# Patient Record
Sex: FEMALE | Race: WHITE | NOT HISPANIC OR LATINO | Employment: OTHER | URBAN - METROPOLITAN AREA
[De-identification: names, ages, dates, MRNs, and addresses within clinical notes are randomized per-mention and may not be internally consistent; named-entity substitution may affect disease eponyms.]

---

## 2023-06-23 DIAGNOSIS — I10 PRIMARY HYPERTENSION: Primary | ICD-10-CM

## 2023-06-23 RX ORDER — METOPROLOL SUCCINATE 25 MG/1
TABLET, EXTENDED RELEASE ORAL
Qty: 90 TABLET | Refills: 0 | Status: SHIPPED | OUTPATIENT
Start: 2023-06-23 | End: 2023-09-29

## 2023-08-14 ENCOUNTER — TELEPHONE (OUTPATIENT)
Dept: PRIMARY CARE | Facility: CLINIC | Age: 73
End: 2023-08-14
Payer: MEDICARE

## 2023-08-14 DIAGNOSIS — F41.9 ANXIETY: ICD-10-CM

## 2023-08-14 NOTE — TELEPHONE ENCOUNTER
Pt is going to dentist 08/29/23 Pt has higher blood pressure due to anxiety of appt are you able to prescribe something   [facility]   [phone]

## 2023-08-15 ENCOUNTER — TELEPHONE (OUTPATIENT)
Dept: PRIMARY CARE | Facility: CLINIC | Age: 73
End: 2023-08-15
Payer: MEDICARE

## 2023-08-16 PROBLEM — F41.9 ANXIETY: Status: ACTIVE | Noted: 2023-08-16

## 2023-08-16 RX ORDER — PROPRANOLOL HYDROCHLORIDE 10 MG/1
TABLET ORAL
Qty: 1 TABLET | Refills: 0 | Status: SHIPPED | OUTPATIENT
Start: 2023-08-16 | End: 2023-12-04 | Stop reason: WASHOUT

## 2023-09-07 ENCOUNTER — TELEPHONE (OUTPATIENT)
Dept: PRIMARY CARE | Facility: CLINIC | Age: 73
End: 2023-09-07
Payer: MEDICARE

## 2023-09-28 DIAGNOSIS — I10 PRIMARY HYPERTENSION: ICD-10-CM

## 2023-09-29 RX ORDER — METOPROLOL SUCCINATE 25 MG/1
TABLET, EXTENDED RELEASE ORAL
Qty: 90 TABLET | Refills: 0 | Status: SHIPPED | OUTPATIENT
Start: 2023-09-29 | End: 2023-11-27 | Stop reason: ALTCHOICE

## 2023-11-11 ENCOUNTER — TELEPHONE (OUTPATIENT)
Dept: PRIMARY CARE | Facility: CLINIC | Age: 73
End: 2023-11-11
Payer: MEDICARE

## 2023-11-11 DIAGNOSIS — I34.1 MITRAL VALVE PROLAPSE: ICD-10-CM

## 2023-11-11 DIAGNOSIS — I73.9 PVD (PERIPHERAL VASCULAR DISEASE) (CMS-HCC): ICD-10-CM

## 2023-11-11 DIAGNOSIS — Z13.220 LIPID SCREENING: ICD-10-CM

## 2023-11-11 DIAGNOSIS — E28.39 ESTROGEN DEFICIENCY: ICD-10-CM

## 2023-11-11 DIAGNOSIS — E55.9 VITAMIN D DEFICIENCY: ICD-10-CM

## 2023-11-11 DIAGNOSIS — Z13.29 THYROID DISORDER SCREEN: ICD-10-CM

## 2023-11-11 DIAGNOSIS — M85.9 DISORDER OF BONE DENSITY AND STRUCTURE, UNSPECIFIED: ICD-10-CM

## 2023-11-14 PROBLEM — I73.9 PVD (PERIPHERAL VASCULAR DISEASE) (CMS-HCC): Status: ACTIVE | Noted: 2023-11-14

## 2023-11-14 PROBLEM — E28.39 ESTROGEN DEFICIENCY: Status: ACTIVE | Noted: 2023-11-14

## 2023-11-14 PROBLEM — I34.1 MITRAL VALVE PROLAPSE: Status: ACTIVE | Noted: 2023-11-14

## 2023-11-14 PROBLEM — E55.9 VITAMIN D DEFICIENCY: Status: ACTIVE | Noted: 2023-11-14

## 2023-11-14 PROBLEM — M85.9 DISORDER OF BONE DENSITY AND STRUCTURE, UNSPECIFIED: Status: ACTIVE | Noted: 2023-11-14

## 2023-11-14 PROBLEM — L40.9 PSORIASIS: Status: ACTIVE | Noted: 2023-11-14

## 2023-11-16 ENCOUNTER — LAB (OUTPATIENT)
Dept: LAB | Facility: LAB | Age: 73
End: 2023-11-16
Payer: MEDICARE

## 2023-11-16 DIAGNOSIS — E28.39 ESTROGEN DEFICIENCY: ICD-10-CM

## 2023-11-16 DIAGNOSIS — Z13.220 LIPID SCREENING: ICD-10-CM

## 2023-11-16 DIAGNOSIS — Z13.29 THYROID DISORDER SCREEN: ICD-10-CM

## 2023-11-16 DIAGNOSIS — I34.1 MITRAL VALVE PROLAPSE: ICD-10-CM

## 2023-11-16 DIAGNOSIS — E55.9 VITAMIN D DEFICIENCY: ICD-10-CM

## 2023-11-16 LAB
25(OH)D3 SERPL-MCNC: 25 NG/ML (ref 30–100)
ALBUMIN SERPL BCP-MCNC: 4.5 G/DL (ref 3.4–5)
ALP SERPL-CCNC: 88 U/L (ref 33–136)
ALT SERPL W P-5'-P-CCNC: 16 U/L (ref 7–45)
ANION GAP SERPL CALC-SCNC: 14 MMOL/L (ref 10–20)
AST SERPL W P-5'-P-CCNC: 15 U/L (ref 9–39)
BASOPHILS # BLD AUTO: 0.04 X10*3/UL (ref 0–0.1)
BASOPHILS NFR BLD AUTO: 0.8 %
BILIRUB SERPL-MCNC: 0.5 MG/DL (ref 0–1.2)
BUN SERPL-MCNC: 15 MG/DL (ref 6–23)
CALCIUM SERPL-MCNC: 9.4 MG/DL (ref 8.6–10.6)
CHLORIDE SERPL-SCNC: 108 MMOL/L (ref 98–107)
CHOLEST SERPL-MCNC: 227 MG/DL (ref 0–199)
CHOLESTEROL/HDL RATIO: 3.7
CO2 SERPL-SCNC: 25 MMOL/L (ref 21–32)
CREAT SERPL-MCNC: 0.82 MG/DL (ref 0.5–1.05)
EOSINOPHIL # BLD AUTO: 0.39 X10*3/UL (ref 0–0.4)
EOSINOPHIL NFR BLD AUTO: 8.1 %
ERYTHROCYTE [DISTWIDTH] IN BLOOD BY AUTOMATED COUNT: 12.7 % (ref 11.5–14.5)
GFR SERPL CREATININE-BSD FRML MDRD: 77 ML/MIN/1.73M*2
GLUCOSE SERPL-MCNC: 128 MG/DL (ref 74–99)
HCT VFR BLD AUTO: 42.5 % (ref 36–46)
HDLC SERPL-MCNC: 61.6 MG/DL
HGB BLD-MCNC: 13.5 G/DL (ref 12–16)
IMM GRANULOCYTES # BLD AUTO: 0.02 X10*3/UL (ref 0–0.5)
IMM GRANULOCYTES NFR BLD AUTO: 0.4 % (ref 0–0.9)
LDLC SERPL CALC-MCNC: 143 MG/DL
LYMPHOCYTES # BLD AUTO: 1.61 X10*3/UL (ref 0.8–3)
LYMPHOCYTES NFR BLD AUTO: 33.3 %
MCH RBC QN AUTO: 28.7 PG (ref 26–34)
MCHC RBC AUTO-ENTMCNC: 31.8 G/DL (ref 32–36)
MCV RBC AUTO: 90 FL (ref 80–100)
MONOCYTES # BLD AUTO: 0.37 X10*3/UL (ref 0.05–0.8)
MONOCYTES NFR BLD AUTO: 7.6 %
NEUTROPHILS # BLD AUTO: 2.41 X10*3/UL (ref 1.6–5.5)
NEUTROPHILS NFR BLD AUTO: 49.8 %
NON HDL CHOLESTEROL: 165 MG/DL (ref 0–149)
NRBC BLD-RTO: 0 /100 WBCS (ref 0–0)
PLATELET # BLD AUTO: 174 X10*3/UL (ref 150–450)
POTASSIUM SERPL-SCNC: 4.1 MMOL/L (ref 3.5–5.3)
PROT SERPL-MCNC: 6.7 G/DL (ref 6.4–8.2)
RBC # BLD AUTO: 4.7 X10*6/UL (ref 4–5.2)
SODIUM SERPL-SCNC: 143 MMOL/L (ref 136–145)
TRIGL SERPL-MCNC: 112 MG/DL (ref 0–149)
TSH SERPL-ACNC: 1.79 MIU/L (ref 0.44–3.98)
VLDL: 22 MG/DL (ref 0–40)
WBC # BLD AUTO: 4.8 X10*3/UL (ref 4.4–11.3)

## 2023-11-16 PROCEDURE — 36415 COLL VENOUS BLD VENIPUNCTURE: CPT

## 2023-11-16 PROCEDURE — 85025 COMPLETE CBC W/AUTO DIFF WBC: CPT

## 2023-11-16 PROCEDURE — 80061 LIPID PANEL: CPT

## 2023-11-16 PROCEDURE — 84443 ASSAY THYROID STIM HORMONE: CPT

## 2023-11-16 PROCEDURE — 80053 COMPREHEN METABOLIC PANEL: CPT

## 2023-11-16 PROCEDURE — 82306 VITAMIN D 25 HYDROXY: CPT

## 2023-11-20 ENCOUNTER — OFFICE VISIT (OUTPATIENT)
Dept: PRIMARY CARE | Facility: CLINIC | Age: 73
End: 2023-11-20
Payer: MEDICARE

## 2023-11-20 VITALS
DIASTOLIC BLOOD PRESSURE: 86 MMHG | WEIGHT: 185.8 LBS | BODY MASS INDEX: 27.52 KG/M2 | HEIGHT: 69 IN | HEART RATE: 88 BPM | SYSTOLIC BLOOD PRESSURE: 144 MMHG

## 2023-11-20 DIAGNOSIS — Z00.00 HEALTHCARE MAINTENANCE: Primary | ICD-10-CM

## 2023-11-20 DIAGNOSIS — Z13.89 ENCOUNTER FOR SCREENING FOR OTHER DISORDER: ICD-10-CM

## 2023-11-20 DIAGNOSIS — Z71.89 CARDIAC RISK COUNSELING: ICD-10-CM

## 2023-11-20 DIAGNOSIS — H61.23 BILATERAL IMPACTED CERUMEN: ICD-10-CM

## 2023-11-20 DIAGNOSIS — Z00.00 ROUTINE GENERAL MEDICAL EXAMINATION AT HEALTH CARE FACILITY: ICD-10-CM

## 2023-11-20 DIAGNOSIS — Z71.89 ADVANCE DIRECTIVE DISCUSSED WITH PATIENT: ICD-10-CM

## 2023-11-20 DIAGNOSIS — Z23 NEED FOR VACCINATION: ICD-10-CM

## 2023-11-20 PROBLEM — B00.1 RECURRENT COLD SORES: Status: ACTIVE | Noted: 2023-11-20

## 2023-11-20 PROBLEM — L40.50 PSORIATIC ARTHRITIS (MULTI): Status: ACTIVE | Noted: 2023-11-20

## 2023-11-20 PROBLEM — R82.81 PYURIA: Status: ACTIVE | Noted: 2023-11-20

## 2023-11-20 PROBLEM — J20.9 ACUTE BRONCHITIS: Status: ACTIVE | Noted: 2023-11-20

## 2023-11-20 PROBLEM — J32.9 SINUSITIS: Status: ACTIVE | Noted: 2023-11-20

## 2023-11-20 PROCEDURE — G0446 INTENS BEHAVE THER CARDIO DX: HCPCS | Performed by: FAMILY MEDICINE

## 2023-11-20 PROCEDURE — 1159F MED LIST DOCD IN RCRD: CPT | Performed by: FAMILY MEDICINE

## 2023-11-20 PROCEDURE — 99214 OFFICE O/P EST MOD 30 MIN: CPT | Performed by: FAMILY MEDICINE

## 2023-11-20 PROCEDURE — 1160F RVW MEDS BY RX/DR IN RCRD: CPT | Performed by: FAMILY MEDICINE

## 2023-11-20 PROCEDURE — 1158F ADVNC CARE PLAN TLK DOCD: CPT | Performed by: FAMILY MEDICINE

## 2023-11-20 PROCEDURE — G0444 DEPRESSION SCREEN ANNUAL: HCPCS | Performed by: FAMILY MEDICINE

## 2023-11-20 PROCEDURE — 1170F FXNL STATUS ASSESSED: CPT | Performed by: FAMILY MEDICINE

## 2023-11-20 PROCEDURE — 99397 PER PM REEVAL EST PAT 65+ YR: CPT | Performed by: FAMILY MEDICINE

## 2023-11-20 PROCEDURE — G0009 ADMIN PNEUMOCOCCAL VACCINE: HCPCS | Performed by: FAMILY MEDICINE

## 2023-11-20 PROCEDURE — 1036F TOBACCO NON-USER: CPT | Performed by: FAMILY MEDICINE

## 2023-11-20 PROCEDURE — 99497 ADVNCD CARE PLAN 30 MIN: CPT | Performed by: FAMILY MEDICINE

## 2023-11-20 PROCEDURE — G0439 PPPS, SUBSEQ VISIT: HCPCS | Performed by: FAMILY MEDICINE

## 2023-11-20 PROCEDURE — 90677 PCV20 VACCINE IM: CPT | Performed by: FAMILY MEDICINE

## 2023-11-20 ASSESSMENT — ENCOUNTER SYMPTOMS
FEVER: 0
HEADACHES: 0
LIGHT-HEADEDNESS: 0
CHOKING: 0
FATIGUE: 0
OCCASIONAL FEELINGS OF UNSTEADINESS: 0
APPETITE CHANGE: 0
LOSS OF SENSATION IN FEET: 0
ARTHRALGIAS: 0
PALPITATIONS: 0
BACK PAIN: 0
FACIAL ASYMMETRY: 0
COUGH: 0
CHEST TIGHTNESS: 0
COLOR CHANGE: 0
ACTIVITY CHANGE: 0
DEPRESSION: 0
DIZZINESS: 0

## 2023-11-20 ASSESSMENT — ACTIVITIES OF DAILY LIVING (ADL)
GROCERY_SHOPPING: INDEPENDENT
BATHING: INDEPENDENT
MANAGING_FINANCES: INDEPENDENT
DOING_HOUSEWORK: INDEPENDENT
DRESSING: INDEPENDENT
TAKING_MEDICATION: INDEPENDENT

## 2023-11-20 ASSESSMENT — PATIENT HEALTH QUESTIONNAIRE - PHQ9
2. FEELING DOWN, DEPRESSED OR HOPELESS: SEVERAL DAYS
SUM OF ALL RESPONSES TO PHQ9 QUESTIONS 1 AND 2: 1
1. LITTLE INTEREST OR PLEASURE IN DOING THINGS: NOT AT ALL
10. IF YOU CHECKED OFF ANY PROBLEMS, HOW DIFFICULT HAVE THESE PROBLEMS MADE IT FOR YOU TO DO YOUR WORK, TAKE CARE OF THINGS AT HOME, OR GET ALONG WITH OTHER PEOPLE: NOT DIFFICULT AT ALL

## 2023-11-20 NOTE — PROGRESS NOTES
"Subjective   Reason for Visit: Janel Mcclendon is an 73 y.o. female here for a Medicare Wellness visit.     Past Medical, Surgical, and Family History reviewed and updated in chart.    Reviewed all medications by prescribing practitioner or clinical pharmacist (such as prescriptions, OTCs, herbal therapies and supplements) and documented in the medical record.    HPI  Patient comes in for physical exam and Pap.     Fam Hx  Mom (77) , SLE  Dad (90) , HTN     Exercise walks  ETOH denies  Caffeine 2-3 cups/day  Tobacco denies  worked for a pre-school  2 girls     Mammogram negative   DEXA  osteopenia due   Colonoscopy  due  Dr. Briggs     Patient denies other complaints at this time..   Patient Self Assessment of Health Status  Patient Self Assessment: Good    Nutrition and Exercise  Current Diet: Well Balanced Diet  Adequate Fluid Intake: Yes  Caffeine: Yes  Exercise Frequency: Regularly    Functional Ability/Level of Safety  Cognitive Impairment Observed: No cognitive impairment observed  Cognitive Impairment Reported: No cognitive impairment reported by patient or family    Home Safety Risk Factors: None    Patient Care Team:  Nelly Tay DO as PCP - General  Nelly Tay DO as PCP - [employer] PCP     Review of Systems   Constitutional:  Negative for activity change, appetite change, fatigue and fever.   HENT:  Negative for congestion.    Respiratory:  Negative for cough, choking and chest tightness.    Cardiovascular:  Negative for chest pain, palpitations and leg swelling.   Musculoskeletal:  Negative for arthralgias, back pain and gait problem.   Skin:  Negative for color change and pallor.   Neurological:  Negative for dizziness, facial asymmetry, light-headedness and headaches.       Objective   Vitals:  /86 (BP Location: Left arm)   Pulse 88   Ht 1.746 m (5' 8.75\")   Wt 84.3 kg (185 lb 12.8 oz)   BMI 27.64 kg/m²       Physical " Exam  Constitutional:       General: She is not in acute distress.     Appearance: Normal appearance. She is not toxic-appearing.   HENT:      Head: Normocephalic.      Right Ear: There is impacted cerumen.      Left Ear: There is impacted cerumen.      Ears:      Comments: Bilateral cerumen impaction appreciated a cerumen spoon was utilized to attempt to remove wax this was unsuccessful and an ear irrigation was performed until visualized clear bilaterally to tympanic membranes with otoscope  Eyes:      Conjunctiva/sclera: Conjunctivae normal.      Pupils: Pupils are equal, round, and reactive to light.   Cardiovascular:      Rate and Rhythm: Normal rate and regular rhythm.      Pulses: Normal pulses.      Heart sounds: Normal heart sounds.   Pulmonary:      Effort: No respiratory distress.      Breath sounds: No wheezing, rhonchi or rales.   Musculoskeletal:         General: No swelling or tenderness.   Skin:     Findings: No lesion or rash.   Neurological:      General: No focal deficit present.      Mental Status: She is alert and oriented to person, place, and time. Mental status is at baseline.      Gait: Gait normal.   Psychiatric:         Mood and Affect: Mood normal.         Behavior: Behavior normal.         Thought Content: Thought content normal.         Judgment: Judgment normal.       Cardiovascular risk discussed and, if needed, lifestyle modifications recommended, including nutritional choices, exercise, and elimination of habits contributing to risk.  We agreed on a plan to reduce current cardiovascular risk.  See the ASCVD risk  plus for data discussed regarding risk and risk reduction opportunities.  Aspirin use/disuse was discussed after reviewing updated guidelines.  Assessment/Plan   Problem List Items Addressed This Visit    None  Visit Diagnoses       Healthcare maintenance    -  Primary    Routine general medical examination at health care facility        Relevant Orders    1 Year  Follow Up In [facility] - PCP - Wellness Exam          1. Patient's blood work discussed at this office visit  2. Patient's current , goal <130, start TYPE II diet  3. Patient's glucose is elevated, start on no added sugar diet  4. Patient's vitamin d low again start back on vitamin D supplementation daily  5. Mammogram negative 2023  6. DEXA 2020 osteopenia due 2025  7. Colonoscopy 8-2013 due 2023 Dr. Briggs      8. Patient to call if questions or concerns

## 2023-11-20 NOTE — ACP (ADVANCE CARE PLANNING)
Confirming Previous Code Status:   Advance Care Planning Note     Discussion Date: 11/20/23   Discussion Participants: patient    The patient wishes to discuss Advance Care Planning today and the following is a brief summary of our discussion.     Patient has capacity to make their own medical decisions: Yes  Health Care Agent/Surrogate Decision Maker documented in chart: Yes    Documents on file and valid:  Advance Directive/Living Will: Yes   Health Care Power of : Yes  Other:     Communication of Medical Status/Prognosis:   Stable    Communication of Treatment Goals/Options:   stable    Treatment Decisions  Goals of Care: survival is prioritized, if goals for quality or survival can reasonably be achieved     Follow Up Plan  Stable  Team Members  stable  Time Statement: Total face to face time spent on advance care planning was 5 minutes with 16 minutes spent in counseling, including the explanation.    Nelly Tay DO  11/20/2023 1:21 PM

## 2023-11-26 ENCOUNTER — TELEMEDICINE (OUTPATIENT)
Dept: PRIMARY CARE | Facility: CLINIC | Age: 73
End: 2023-11-26
Payer: MEDICARE

## 2023-11-26 DIAGNOSIS — I10 PRIMARY HYPERTENSION: ICD-10-CM

## 2023-11-26 DIAGNOSIS — I34.1 MITRAL VALVE PROLAPSE: Primary | ICD-10-CM

## 2023-11-26 DIAGNOSIS — I34.1 MITRAL VALVE PROLAPSE: ICD-10-CM

## 2023-11-26 DIAGNOSIS — I10 PRIMARY HYPERTENSION: Primary | ICD-10-CM

## 2023-11-26 PROCEDURE — 99213 OFFICE O/P EST LOW 20 MIN: CPT | Performed by: FAMILY MEDICINE

## 2023-11-26 RX ORDER — AMLODIPINE BESYLATE 5 MG/1
5 TABLET ORAL DAILY
Qty: 30 TABLET | Refills: 0 | Status: SHIPPED | OUTPATIENT
Start: 2023-11-26 | End: 2023-11-27 | Stop reason: SDUPTHER

## 2023-11-26 NOTE — PROGRESS NOTES
Patient called with difficulties with blood pressure readings states that her blood pressure systolic has been in the 180s throughout the week but then today got up about 200.  Patient states her blood pressure was about 205 the bottom numbers have been into the 90s.  She has had no symptoms associated with it she has had no chest pain no shortness of breath no dizziness no lightheadedness.  No abdominal pain or discomfort no swelling of the legs or feet.  She has had no paroxysmal nocturnal dyspnea.  Patient is on metoprolol therapy.    States that she cannot tolerate increase metoprolol made her dizzy and lightheaded.    Patient had been seen recently in the office and was told to monitor the blood pressures.    Patient otherwise has been doing well.      O: Blood pressure reading at home has been 204/98.  Pulse rate was 82    Patient alert appears in no distress via telephone call.    Impression hypertension    Plan adding amlodipine to metoprolol.    If difficulties with headache or double vision or blurry vision or chest pain or shortness of breath patient is to go to ER for further evaluation.    She is to call tomorrow morning for appointment in the office to be rechecked.    Blood pressure should rise above 200 on a regular basis she is to go to the ER.    Recommend avoiding all salt recommend no alcohol and keep caffeine 1 cup daily.

## 2023-11-27 ENCOUNTER — OFFICE VISIT (OUTPATIENT)
Dept: PRIMARY CARE | Facility: CLINIC | Age: 73
End: 2023-11-27
Payer: MEDICARE

## 2023-11-27 VITALS
OXYGEN SATURATION: 98 % | WEIGHT: 183.38 LBS | BODY MASS INDEX: 27.28 KG/M2 | DIASTOLIC BLOOD PRESSURE: 91 MMHG | SYSTOLIC BLOOD PRESSURE: 191 MMHG | HEART RATE: 69 BPM

## 2023-11-27 DIAGNOSIS — Z13.820 ENCOUNTER FOR OSTEOPOROSIS SCREENING IN ASYMPTOMATIC POSTMENOPAUSAL PATIENT: ICD-10-CM

## 2023-11-27 DIAGNOSIS — R73.9 HYPERGLYCEMIA: ICD-10-CM

## 2023-11-27 DIAGNOSIS — I10 ESSENTIAL (PRIMARY) HYPERTENSION: Primary | ICD-10-CM

## 2023-11-27 DIAGNOSIS — R73.03 PREDIABETES: ICD-10-CM

## 2023-11-27 DIAGNOSIS — I73.9 PVD (PERIPHERAL VASCULAR DISEASE) (CMS-HCC): ICD-10-CM

## 2023-11-27 DIAGNOSIS — F41.9 ANXIETY: ICD-10-CM

## 2023-11-27 DIAGNOSIS — Z78.0 ENCOUNTER FOR OSTEOPOROSIS SCREENING IN ASYMPTOMATIC POSTMENOPAUSAL PATIENT: ICD-10-CM

## 2023-11-27 DIAGNOSIS — L40.50 PSORIATIC ARTHRITIS (MULTI): ICD-10-CM

## 2023-11-27 PROBLEM — J20.9 ACUTE BRONCHITIS: Status: RESOLVED | Noted: 2023-11-20 | Resolved: 2023-11-27

## 2023-11-27 PROBLEM — J32.9 SINUSITIS: Status: RESOLVED | Noted: 2023-11-20 | Resolved: 2023-11-27

## 2023-11-27 LAB — POC HEMOGLOBIN A1C: 6.2 % (ref 4.2–6.5)

## 2023-11-27 PROCEDURE — 3080F DIAST BP >= 90 MM HG: CPT | Performed by: FAMILY MEDICINE

## 2023-11-27 PROCEDURE — 1159F MED LIST DOCD IN RCRD: CPT | Performed by: FAMILY MEDICINE

## 2023-11-27 PROCEDURE — 1036F TOBACCO NON-USER: CPT | Performed by: FAMILY MEDICINE

## 2023-11-27 PROCEDURE — 1160F RVW MEDS BY RX/DR IN RCRD: CPT | Performed by: FAMILY MEDICINE

## 2023-11-27 PROCEDURE — 1158F ADVNC CARE PLAN TLK DOCD: CPT | Performed by: FAMILY MEDICINE

## 2023-11-27 PROCEDURE — 3077F SYST BP >= 140 MM HG: CPT | Performed by: FAMILY MEDICINE

## 2023-11-27 PROCEDURE — 83036 HEMOGLOBIN GLYCOSYLATED A1C: CPT | Performed by: FAMILY MEDICINE

## 2023-11-27 PROCEDURE — 99214 OFFICE O/P EST MOD 30 MIN: CPT | Performed by: FAMILY MEDICINE

## 2023-11-27 RX ORDER — ATENOLOL 50 MG/1
50 TABLET ORAL EVERY EVENING
Qty: 90 TABLET | Refills: 0 | Status: SHIPPED | OUTPATIENT
Start: 2023-11-27 | End: 2023-12-04 | Stop reason: SDUPTHER

## 2023-11-27 RX ORDER — AMLODIPINE BESYLATE 10 MG/1
10 TABLET ORAL DAILY
Qty: 90 TABLET | Refills: 0 | Status: SHIPPED | OUTPATIENT
Start: 2023-11-27 | End: 2024-02-20

## 2023-11-27 ASSESSMENT — ENCOUNTER SYMPTOMS
SWEATS: 0
RHINORRHEA: 0
DYSURIA: 0
ARTHRALGIAS: 0
CONSTITUTIONAL NEGATIVE: 1
FATIGUE: 0
ALLERGIC/IMMUNOLOGIC NEGATIVE: 1
FREQUENCY: 0
ADENOPATHY: 0
MYALGIAS: 0
PSYCHIATRIC NEGATIVE: 1
ACTIVITY CHANGE: 0
SHORTNESS OF BREATH: 0
PND: 0
TROUBLE SWALLOWING: 0
APPETITE CHANGE: 0
COUGH: 0
EYE DISCHARGE: 0
SHORTNESS OF BREATH: 0
CONSTIPATION: 0
EYES NEGATIVE: 1
ENDOCRINE NEGATIVE: 1
NAUSEA: 0
SINUS PRESSURE: 0
HEADACHES: 0
NECK STIFFNESS: 0
WHEEZING: 0
COLOR CHANGE: 0
ABDOMINAL PAIN: 0
NECK PAIN: 0
FEVER: 0
HYPERTENSION: 1
HEMATOLOGIC/LYMPHATIC NEGATIVE: 1
CHILLS: 0
BRUISES/BLEEDS EASILY: 0
AGITATION: 0
SINUS PAIN: 0
POLYPHAGIA: 0
FLANK PAIN: 0
VOICE CHANGE: 0
CHEST TIGHTNESS: 0
NEUROLOGICAL NEGATIVE: 1
EYE PAIN: 0
ORTHOPNEA: 0
NERVOUS/ANXIOUS: 0
BLURRED VISION: 0
HEMATURIA: 0
WOUND: 0
POLYDIPSIA: 0
UNEXPECTED WEIGHT CHANGE: 0
SLEEP DISTURBANCE: 0
SORE THROAT: 0
MUSCULOSKELETAL NEGATIVE: 1
DIZZINESS: 0
FACIAL SWELLING: 0
BLOOD IN STOOL: 0
EYE REDNESS: 0
PALPITATIONS: 0
BACK PAIN: 0
VOMITING: 0
DIARRHEA: 0
DIAPHORESIS: 0
JOINT SWELLING: 0
HYPERTENSION: 1
EYE ITCHING: 0

## 2023-11-27 NOTE — PROGRESS NOTES
Subjective   Patient ID: Janel Mcclendon is a 73 y.o. female who presents for Hypertension.    73 year old female with BP reading 204/98. B.P. 180 systolic every time and the bottom number stays in the 90's. Denies CP, SOB, Dizziness. Started last Monday. History of MVP and pt on Metoprolol. No caffeine use, no ETOH, no leg/foot swelling.     Hypertension  This is a chronic problem. The current episode started in the past 7 days. The problem has been gradually worsening since onset. The problem is uncontrolled. Pertinent negatives include no chest pain or shortness of breath.        Review of Systems   Constitutional: Negative.    HENT: Negative.     Eyes: Negative.    Respiratory:  Negative for shortness of breath.    Cardiovascular:  Negative for chest pain.   Endocrine: Negative.    Genitourinary: Negative.    Musculoskeletal: Negative.    Skin: Negative.    Allergic/Immunologic: Negative.    Neurological: Negative.    Hematological: Negative.    Psychiatric/Behavioral: Negative.         Objective   There were no vitals taken for this visit.    Physical Exam  Neurological:      Mental Status: She is alert.         Assessment/Plan   Problem List Items Addressed This Visit             ICD-10-CM    Mitral valve prolapse I34.1     Other Visit Diagnoses         Codes    Primary hypertension    -  Primary I10

## 2023-11-27 NOTE — PATIENT INSTRUCTIONS
F/up in 2-3 days for htn and call sooner if BP or anxiety goes up     Current Outpatient Medications   Medication Sig Dispense Refill    amLODIPine (Norvasc) 10 mg tablet Take 1 tablet (10 mg) by mouth once daily. 90 tablet 0    atenolol (Tenormin) 50 mg tablet Take 1 tablet (50 mg) by mouth once daily in the evening. 90 tablet 0    propranolol (Inderal) 10 mg tablet TAKE 1 TABLET BY MOUTH 1 HOUR PRIOR TO DENTAL APPOINTMENT. (Patient not taking: Reported on 11/27/2023) 1 tablet 0     No current facility-administered medications for this visit.     Dear Ms. Mcclendon:     To help you more effectively manage your high blood pressure, we would like to remind you of the following preventive measures you can take at home:    1) Eat right: Your diet should be rich in fruits, vegetables, potassium, and low-fat dairy products. You should also reduce your intake of sodium and fats, particularly saturated fats.    2) Maintain a healthy weight: Try to achieve and maintain a healthy weight. If you are unsure what this means for you, please contact our clinic.    3) Exercise: Try to get at least 30 minutes of aerobic exercise every day.    4) Moderate your alcohol consumption: Limit your alcohol intake to one drink per day.    5) Monitor your blood pressure: You should check your blood pressure regularly. Notify our clinic if your blood pressure readings are consistently higher than recommended.    We have included a personalized hypertension report card on the following page that lists your most recent blood pressure readings and lab results, along with your ideal values. If you have any questions or concerns about these instructions, your results, or your improving health, please don't hesitate to call.    Thank you for including us as members of your health care team.    [unfilled]    Sincerely,         Nelly Tay, DO    Enclosure      Hypertension Report Card for Janel Mcclendon  November 27, 2023:     Below is a summary  of recent tests related to your hypertension that can help you manage your health.     Blood Pressure:   Normal blood pressure values are 120/80 or lower. Your blood pressure values should be less than 140/90. If your readings at home are consistently higher than this, please contact me.     Your most recent blood pressure readings at our clinic were:   BP Readings from Last 3 Encounters:   11/27/23 (Abnormal) 186/92   11/20/23 144/86   11/18/22 144/70       Creatinine:   High blood pressure can cause a loss of kidney function. Creatinine is a measure of this kidney function.      Your most recent creatinine levels were:   Creatinine (mg/dL)   Date Value   11/16/2023 0.82   11/14/2022 0.80   09/17/2021 0.75   08/24/2020 0.86           Potassium:  Potassium is an electrolyte in your blood that can be affected by blood pressure treatment.     Your most recent potassium levels were:  Potassium (mmol/L)   Date Value   11/16/2023 4.1   11/14/2022 3.7   09/17/2021 4.2   08/24/2020 3.9

## 2023-11-27 NOTE — PATIENT INSTRUCTIONS
Amlodipine 5mg, 1 po daily.  Continue Metoprolol  Call if BP continues to be over 200 systolic  Report to ER if CP, SOB, HA, dizziness or lightheadness occur.

## 2023-11-27 NOTE — PROGRESS NOTES
Subjective   Patient ID: Janel Mcclendon is a 73 y.o. female who presents for Hypertension.  Today she is accompanied by alone.     Hypertension  This is a chronic problem. Associated symptoms include anxiety and malaise/fatigue. Pertinent negatives include no blurred vision, chest pain, headaches, neck pain, orthopnea, palpitations, peripheral edema, PND, shortness of breath or sweats. There are no associated agents to hypertension. Risk factors for coronary artery disease include stress, family history and post-menopausal state. Past treatments include beta blockers and calcium channel blockers. The current treatment provides mild improvement. There are no compliance problems.  Hypertensive end-organ damage includes PVD. There is no history of angina, kidney disease, CAD/MI, CVA, heart failure, left ventricular hypertrophy or retinopathy. There is no history of chronic renal disease, coarctation of the aorta, hyperaldosteronism, hypercortisolism, hyperparathyroidism, a hypertension causing med, pheochromocytoma, renovascular disease, sleep apnea or a thyroid problem.     Subjective:   Janel Mcclendon is a 73 y.o. female with hypertension. Went to her sisters and 911 came to see her. EKG was normal and BP did not come down until yesterday due to BP was over 200/90s.   Has anxiety and it could be due to her daughter.    Current Outpatient Medications   Medication Sig Dispense Refill    amLODIPine (Norvasc) 10 mg tablet Take 1 tablet (10 mg) by mouth once daily. 90 tablet 0    atenolol (Tenormin) 50 mg tablet Take 1 tablet (50 mg) by mouth once daily in the evening. 90 tablet 0    propranolol (Inderal) 10 mg tablet TAKE 1 TABLET BY MOUTH 1 HOUR PRIOR TO DENTAL APPOINTMENT. (Patient not taking: Reported on 11/27/2023) 1 tablet 0     No current facility-administered medications for this visit.      Hypertension ROS: taking medications as instructed, no medication side effects noted, no TIA's, no chest pain on exertion,  no dyspnea on exertion, no swelling of ankles, no orthostatic dizziness or lightheadedness, no orthopnea or paroxysmal nocturnal dyspnea, no palpitations, and no intermittent claudication symptoms.   New concerns: none.     Objective:   Blood Pressure (Abnormal) 191/91   Pulse 69   Weight 83.2 kg (183 lb 6 oz)   Oxygen Saturation 98%   Body Mass Index 27.28 kg/m²      Review of Systems   Constitutional:  Positive for malaise/fatigue. Negative for activity change, appetite change, chills, diaphoresis, fatigue, fever and unexpected weight change.   HENT:  Negative for congestion, dental problem, drooling, ear discharge, ear pain, facial swelling, hearing loss, nosebleeds, postnasal drip, rhinorrhea, sinus pressure, sinus pain, sneezing, sore throat, tinnitus, trouble swallowing and voice change.    Eyes:  Negative for blurred vision, pain, discharge, redness, itching and visual disturbance.   Respiratory:  Negative for cough, chest tightness, shortness of breath and wheezing.    Cardiovascular:  Negative for chest pain, palpitations, orthopnea, leg swelling and PND.   Gastrointestinal:  Negative for abdominal pain, blood in stool, constipation, diarrhea, nausea and vomiting.   Endocrine: Negative for cold intolerance, heat intolerance, polydipsia, polyphagia and polyuria.   Genitourinary:  Negative for decreased urine volume, dysuria, flank pain, frequency, hematuria and urgency.   Musculoskeletal:  Negative for arthralgias, back pain, gait problem, joint swelling, myalgias, neck pain and neck stiffness.   Skin:  Negative for color change, pallor, rash and wound.   Neurological:  Negative for dizziness and headaches.   Hematological:  Negative for adenopathy. Does not bruise/bleed easily.   Psychiatric/Behavioral:  Negative for agitation, behavioral problems and sleep disturbance. The patient is not nervous/anxious.        Objective   Blood Pressure (Abnormal) 191/91   Pulse 69   Weight 83.2 kg (183 lb 6 oz)    Oxygen Saturation 98%   Body Mass Index 27.28 kg/m²   BSA: 2.01 meters squared  Growth percentiles: Facility age limit for growth %amparo is 20 years. Facility age limit for growth %ampaor is 20 years.     Physical Exam  Vitals and nursing note reviewed.   Constitutional:       General: She is not in acute distress.     Appearance: Normal appearance. She is normal weight. She is not ill-appearing.   HENT:      Head: Normocephalic.      Right Ear: Tympanic membrane, ear canal and external ear normal.      Left Ear: Tympanic membrane, ear canal and external ear normal.      Nose: Nose normal. No rhinorrhea.      Mouth/Throat:      Mouth: Mucous membranes are moist.      Pharynx: Oropharynx is clear.   Eyes:      Extraocular Movements: Extraocular movements intact.      Conjunctiva/sclera: Conjunctivae normal.      Pupils: Pupils are equal, round, and reactive to light.   Cardiovascular:      Rate and Rhythm: Normal rate and regular rhythm.      Pulses: Normal pulses.      Heart sounds: Normal heart sounds. No murmur heard.  Pulmonary:      Effort: Pulmonary effort is normal. No respiratory distress.      Breath sounds: Normal breath sounds. No wheezing or rales.   Abdominal:      General: Abdomen is flat. Bowel sounds are normal.      Palpations: Abdomen is soft.      Tenderness: There is no abdominal tenderness. There is no guarding or rebound.      Hernia: No hernia is present.   Musculoskeletal:         General: Normal range of motion.      Cervical back: Normal range of motion and neck supple. No rigidity or tenderness.      Right lower leg: No edema.      Left lower leg: No edema.   Lymphadenopathy:      Cervical: No cervical adenopathy.   Skin:     General: Skin is warm and dry.   Neurological:      General: No focal deficit present.      Mental Status: She is alert and oriented to person, place, and time.      Sensory: No sensory deficit.      Motor: No weakness.      Coordination: Coordination normal.    Psychiatric:         Mood and Affect: Mood normal.         Behavior: Behavior normal.         Thought Content: Thought content normal.         Judgment: Judgment normal.         Assessment/Plan   Problem List Items Addressed This Visit             ICD-10-CM    Anxiety F41.9    PVD (peripheral vascular disease) (CMS/[facility]) I73.9     Stable on current medications  Continue meds and exercise.            Psoriatic arthritis (CMS/[facility]) L40.50     Stable on current medications  Continue meds and exercise.            Essential (primary) hypertension - Primary I10    Relevant Medications    amLODIPine (Norvasc) 10 mg tablet    atenolol (Tenormin) 50 mg tablet    Prediabetes R73.03     Other Visit Diagnoses       Diagnosis Codes    Hyperglycemia     R73.9    Relevant Orders    POCT glycosylated hemoglobin (Hb A1C) manually resulted (Completed)    Encounter for osteoporosis screening in asymptomatic postmenopausal patient     Z13.820, Z78.0    Relevant Orders    XR DEXA bone density          F/up in 2-3 days for htn and call sooner if BP or anxiety goes up   Dear Ms. Mcclendon:     To help you more effectively manage your high blood pressure, we would like to remind you of the following preventive measures you can take at home:    1) Eat right: Your diet should be rich in fruits, vegetables, potassium, and low-fat dairy products. You should also reduce your intake of sodium and fats, particularly saturated fats.    2) Maintain a healthy weight: Try to achieve and maintain a healthy weight. If you are unsure what this means for you, please contact our clinic.    3) Exercise: Try to get at least 30 minutes of aerobic exercise every day.    4) Moderate your alcohol consumption: Limit your alcohol intake to one drink per day.    5) Monitor your blood pressure: You should check your blood pressure regularly. Notify our clinic if your blood pressure readings are consistently higher than recommended.    We have included a personalized  hypertension report card on the following page that lists your most recent blood pressure readings and lab results, along with your ideal values. If you have any questions or concerns about these instructions, your results, or your improving health, please don't hesitate to call.    Thank you for including us as members of your health care team.    [unfilled]    Sincerely,         Nelly Tay, DO    Enclosure      Hypertension Report Card for Janel Mcclendon  November 27, 2023:     Below is a summary of recent tests related to your hypertension that can help you manage your health.     Blood Pressure:   Normal blood pressure values are 120/80 or lower. Your blood pressure values should be less than 140/90. If your readings at home are consistently higher than this, please contact me.     Your most recent blood pressure readings at our clinic were:   BP Readings from Last 3 Encounters:   11/27/23 (Abnormal) 191/91   11/20/23 144/86   11/18/22 144/70       Creatinine:   High blood pressure can cause a loss of kidney function. Creatinine is a measure of this kidney function.      Your most recent creatinine levels were:   Creatinine (mg/dL)   Date Value   11/16/2023 0.82   11/14/2022 0.80   09/17/2021 0.75   08/24/2020 0.86           Potassium:  Potassium is an electrolyte in your blood that can be affected by blood pressure treatment.     Your most recent potassium levels were:  Potassium (mmol/L)   Date Value   11/16/2023 4.1   11/14/2022 3.7   09/17/2021 4.2   08/24/2020 3.9       EKG not available review

## 2023-11-30 ENCOUNTER — APPOINTMENT (OUTPATIENT)
Dept: PRIMARY CARE | Facility: CLINIC | Age: 73
End: 2023-11-30
Payer: MEDICARE

## 2023-12-04 ENCOUNTER — OFFICE VISIT (OUTPATIENT)
Dept: PRIMARY CARE | Facility: CLINIC | Age: 73
End: 2023-12-04
Payer: MEDICARE

## 2023-12-04 VITALS
HEART RATE: 78 BPM | SYSTOLIC BLOOD PRESSURE: 144 MMHG | DIASTOLIC BLOOD PRESSURE: 82 MMHG | WEIGHT: 182.8 LBS | BODY MASS INDEX: 27.19 KG/M2

## 2023-12-04 DIAGNOSIS — I10 ESSENTIAL (PRIMARY) HYPERTENSION: ICD-10-CM

## 2023-12-04 PROCEDURE — 99214 OFFICE O/P EST MOD 30 MIN: CPT | Performed by: FAMILY MEDICINE

## 2023-12-04 PROCEDURE — 3077F SYST BP >= 140 MM HG: CPT | Performed by: FAMILY MEDICINE

## 2023-12-04 PROCEDURE — 1158F ADVNC CARE PLAN TLK DOCD: CPT | Performed by: FAMILY MEDICINE

## 2023-12-04 PROCEDURE — 3079F DIAST BP 80-89 MM HG: CPT | Performed by: FAMILY MEDICINE

## 2023-12-04 PROCEDURE — 1160F RVW MEDS BY RX/DR IN RCRD: CPT | Performed by: FAMILY MEDICINE

## 2023-12-04 PROCEDURE — 1036F TOBACCO NON-USER: CPT | Performed by: FAMILY MEDICINE

## 2023-12-04 PROCEDURE — 1159F MED LIST DOCD IN RCRD: CPT | Performed by: FAMILY MEDICINE

## 2023-12-04 RX ORDER — ATENOLOL 100 MG/1
100 TABLET ORAL EVERY EVENING
Qty: 90 TABLET | Refills: 3 | Status: SHIPPED | OUTPATIENT
Start: 2023-12-04

## 2023-12-04 ASSESSMENT — ENCOUNTER SYMPTOMS
APPETITE CHANGE: 0
ACTIVITY CHANGE: 0
DIZZINESS: 0
HEADACHES: 0
FEVER: 0
ARTHRALGIAS: 0
CHEST TIGHTNESS: 0
PALPITATIONS: 0
FATIGUE: 0
COLOR CHANGE: 0
FACIAL ASYMMETRY: 0
BACK PAIN: 0
CHOKING: 0
LIGHT-HEADEDNESS: 0
COUGH: 0

## 2023-12-04 NOTE — PROGRESS NOTES
Subjective   Patient ID: Janel Mcclendon is a 73 y.o. female who presents for Follow-up (BP).    HPI   Patient is here to follow-up on her blood pressure she was given increased dose of amlodipine stopped her metoprolol continued on Tenormin.    She feels very jittery, anxious and then feels that her blood pressure.     Review of Systems   Constitutional:  Negative for activity change, appetite change, fatigue and fever.   HENT:  Negative for congestion.    Respiratory:  Negative for cough, choking and chest tightness.    Cardiovascular:  Negative for chest pain, palpitations and leg swelling.   Musculoskeletal:  Negative for arthralgias, back pain and gait problem.   Skin:  Negative for color change and pallor.   Neurological:  Negative for dizziness, facial asymmetry, light-headedness and headaches.       Objective   /82 (BP Location: Left arm)   Pulse 78   Wt 82.9 kg (182 lb 12.8 oz)   BMI 27.19 kg/m²   BSA Body surface area is 2.01 meters squared.      Physical Exam  Constitutional:       General: She is not in acute distress.     Appearance: Normal appearance. She is not toxic-appearing.   HENT:      Head: Normocephalic.      Right Ear: Tympanic membrane, ear canal and external ear normal.      Left Ear: Tympanic membrane, ear canal and external ear normal.      Nose: Nose normal.      Mouth/Throat:      Pharynx: Oropharynx is clear.   Eyes:      Conjunctiva/sclera: Conjunctivae normal.      Pupils: Pupils are equal, round, and reactive to light.   Cardiovascular:      Rate and Rhythm: Normal rate and regular rhythm.      Pulses: Normal pulses.      Heart sounds: Normal heart sounds.   Pulmonary:      Effort: No respiratory distress.      Breath sounds: No wheezing, rhonchi or rales.   Abdominal:      General: Bowel sounds are normal. There is no distension.      Palpations: Abdomen is soft.      Tenderness: There is no abdominal tenderness.   Musculoskeletal:         General: No swelling or  tenderness.      Cervical back: No tenderness.   Skin:     Findings: No lesion or rash.   Neurological:      General: No focal deficit present.      Mental Status: She is alert and oriented to person, place, and time. Mental status is at baseline.      Gait: Gait normal.   Psychiatric:         Mood and Affect: Mood normal.         Behavior: Behavior normal.         Thought Content: Thought content normal.         Judgment: Judgment normal.       Office Visit on 11/27/2023   Component Date Value Ref Range Status    POC HEMOGLOBIN A1c 11/27/2023 6.2  4.2 - 6.5 % Final   Lab on 11/16/2023   Component Date Value Ref Range Status    WBC 11/16/2023 4.8  4.4 - 11.3 x10*3/uL Final    nRBC 11/16/2023 0.0  0.0 - 0.0 /100 WBCs Final    RBC 11/16/2023 4.70  4.00 - 5.20 x10*6/uL Final    Hemoglobin 11/16/2023 13.5  12.0 - 16.0 g/dL Final    Hematocrit 11/16/2023 42.5  36.0 - 46.0 % Final    MCV 11/16/2023 90  80 - 100 fL Final    MCH 11/16/2023 28.7  26.0 - 34.0 pg Final    MCHC 11/16/2023 31.8 (L)  32.0 - 36.0 g/dL Final    RDW 11/16/2023 12.7  11.5 - 14.5 % Final    Platelets 11/16/2023 174  150 - 450 x10*3/uL Final    Neutrophils % 11/16/2023 49.8  40.0 - 80.0 % Final    Immature Granulocytes %, Automated 11/16/2023 0.4  0.0 - 0.9 % Final    Immature Granulocyte Count (IG) includes promyelocytes, myelocytes and metamyelocytes but does not include bands. Percent differential counts (%) should be interpreted in the context of the absolute cell counts (cells/UL).    Lymphocytes % 11/16/2023 33.3  13.0 - 44.0 % Final    Monocytes % 11/16/2023 7.6  2.0 - 10.0 % Final    Eosinophils % 11/16/2023 8.1  0.0 - 6.0 % Final    Basophils % 11/16/2023 0.8  0.0 - 2.0 % Final    Neutrophils Absolute 11/16/2023 2.41  1.60 - 5.50 x10*3/uL Final    Percent differential counts (%) should be interpreted in the context of the absolute cell counts (cells/uL).    Immature Granulocytes Absolute, Au* 11/16/2023 0.02  0.00 - 0.50 x10*3/uL Final     Lymphocytes Absolute 11/16/2023 1.61  0.80 - 3.00 x10*3/uL Final    Monocytes Absolute 11/16/2023 0.37  0.05 - 0.80 x10*3/uL Final    Eosinophils Absolute 11/16/2023 0.39  0.00 - 0.40 x10*3/uL Final    Basophils Absolute 11/16/2023 0.04  0.00 - 0.10 x10*3/uL Final    Glucose 11/16/2023 128 (H)  74 - 99 mg/dL Final    Sodium 11/16/2023 143  136 - 145 mmol/L Final    Potassium 11/16/2023 4.1  3.5 - 5.3 mmol/L Final    Chloride 11/16/2023 108 (H)  98 - 107 mmol/L Final    Bicarbonate 11/16/2023 25  21 - 32 mmol/L Final    Anion Gap 11/16/2023 14  10 - 20 mmol/L Final    Urea Nitrogen 11/16/2023 15  6 - 23 mg/dL Final    Creatinine 11/16/2023 0.82  0.50 - 1.05 mg/dL Final    eGFR 11/16/2023 77  >60 mL/min/1.73m*2 Final    Calculations of estimated GFR are performed using the 2021 CKD-EPI Study Refit equation without the race variable for the IDMS-Traceable creatinine methods.  https://jasn.asnjournals.org/content/early/2021/09/22/ASN.1824319748    Calcium 11/16/2023 9.4  8.6 - 10.6 mg/dL Final    Albumin 11/16/2023 4.5  3.4 - 5.0 g/dL Final    Alkaline Phosphatase 11/16/2023 88  33 - 136 U/L Final    Total Protein 11/16/2023 6.7  6.4 - 8.2 g/dL Final    AST 11/16/2023 15  9 - 39 U/L Final    Bilirubin, Total 11/16/2023 0.5  0.0 - 1.2 mg/dL Final    ALT 11/16/2023 16  7 - 45 U/L Final    Patients treated with Sulfasalazine may generate falsely decreased results for ALT.    Cholesterol 11/16/2023 227 (H)  0 - 199 mg/dL Final          Age      Desirable   Borderline High   High     0-19 Y     0 - 169       170 - 199     >/= 200    20-24 Y     0 - 189       190 - 224     >/= 225         >24 Y     0 - 199       200 - 239     >/= 240   **All ranges are based on fasting samples. Specific   therapeutic targets will vary based on patient-specific   cardiac risk.    Pediatric guidelines reference:Pediatrics 2011, 128(S5).Adult guidelines reference: NCEP ATPIII Guidelines,MAYA 2001, 258:2486-97    Venipuncture immediately after or  during the administration of Metamizole may lead to falsely low results. Testing should be performed immediately prior to Metamizole dosing.    HDL-Cholesterol 11/16/2023 61.6  mg/dL Final      Age       Very Low   Low     Normal    High    0-19 Y    < 35      < 40     40-45     ----  20-24 Y    ----     < 40      >45      ----        >24 Y      ----     < 40     40-60      >60      Cholesterol/HDL Ratio 11/16/2023 3.7   Final      Ref Values  Desirable  < 3.4  High Risk  > 5.0    LDL Calculated 11/16/2023 143 (H)  <=99 mg/dL Final                                Near   Borderline      AGE      Desirable  Optimal    High     High     Very High     0-19 Y     0 - 109     ---    110-129   >/= 130     ----    20-24 Y     0 - 119     ---    120-159   >/= 160     ----      >24 Y     0 -  99   100-129  130-159   160-189     >/=190      VLDL 11/16/2023 22  0 - 40 mg/dL Final    Triglycerides 11/16/2023 112  0 - 149 mg/dL Final       Age         Desirable   Borderline High   High     Very High   0 D-90 D    19 - 174         ----         ----        ----  91 D- 9 Y     0 -  74        75 -  99     >/= 100      ----    10-19 Y     0 -  89        90 - 129     >/= 130      ----    20-24 Y     0 - 114       115 - 149     >/= 150      ----         >24 Y     0 - 149       150 - 199    200- 499    >/= 500    Venipuncture immediately after or during the administration of Metamizole may lead to falsely low results. Testing should be performed immediately prior to Metamizole dosing.    Non HDL Cholesterol 11/16/2023 165 (H)  0 - 149 mg/dL Final          Age       Desirable   Borderline High   High     Very High     0-19 Y     0 - 119       120 - 144     >/= 145    >/= 160    20-24 Y     0 - 149       150 - 189     >/= 190      ----         >24 Y    30 mg/dL above LDL Cholesterol goal      Thyroid Stimulating Hormone 11/16/2023 1.79  0.44 - 3.98 mIU/L Final    Vitamin D, 25-Hydroxy, Total 11/16/2023 25 (L)  30 - 100 ng/mL Final      Current Outpatient Medications on File Prior to Visit   Medication Sig Dispense Refill    amLODIPine (Norvasc) 10 mg tablet Take 1 tablet (10 mg) by mouth once daily. 90 tablet 0    [DISCONTINUED] atenolol (Tenormin) 50 mg tablet Take 1 tablet (50 mg) by mouth once daily in the evening. 90 tablet 0    [DISCONTINUED] propranolol (Inderal) 10 mg tablet TAKE 1 TABLET BY MOUTH 1 HOUR PRIOR TO DENTAL APPOINTMENT. (Patient not taking: Reported on 11/27/2023) 1 tablet 0     No current facility-administered medications on file prior to visit.     No images are attached to the encounter.            Assessment/Plan   Diagnoses and all orders for this visit:  Essential (primary) hypertension  -     atenolol (Tenormin) 100 mg tablet; Take 1 tablet (100 mg) by mouth once daily in the evening.  Patient to continue on amlodipine  Patient to increase dose of atenolol to 100mg daily  Patient to see us in 2 weeks continue to monitor her bp at home first thing in am, jot down reads bring list in 2 weeks  Patient to call if questions or concerns

## 2023-12-09 ENCOUNTER — TELEPHONE (OUTPATIENT)
Dept: PRIMARY CARE | Facility: CLINIC | Age: 73
End: 2023-12-09
Payer: MEDICARE

## 2023-12-09 NOTE — TELEPHONE ENCOUNTER
Pt calling states Dr Tay increased her BP medication-atenolol from 50mg to 100mg twice per day  and the amlodipine 10mg once per day    Bp is coming down 140/73 heart rate of 62 this morning    Pt would like to know how low her heart rate can go before she should be concerned?    Please advise [phone]

## 2023-12-16 ASSESSMENT — ENCOUNTER SYMPTOMS
DIZZINESS: 0
COUGH: 0
FATIGUE: 0
ARTHRALGIAS: 0
ACTIVITY CHANGE: 0
PALPITATIONS: 0
COLOR CHANGE: 0
CHEST TIGHTNESS: 0
FACIAL ASYMMETRY: 0
CHOKING: 0
LIGHT-HEADEDNESS: 0
HEADACHES: 0
FEVER: 0
BACK PAIN: 0
APPETITE CHANGE: 0

## 2023-12-16 NOTE — PROGRESS NOTES
Subjective   Patient ID: Janel Mcclendon is a 73 y.o. female who presents for Follow-up (BP).    HPI   Patient is here to follow-up on her blood pressure she was given increased dose of amlodipine stopped her metoprolol continued on Tenormin.    She feels very jittery, anxious and then feels that her blood pressure.     Review of Systems   Constitutional:  Negative for activity change, appetite change, fatigue and fever.   HENT:  Negative for congestion.    Respiratory:  Negative for cough, choking and chest tightness.    Cardiovascular:  Negative for chest pain, palpitations and leg swelling.   Musculoskeletal:  Negative for arthralgias, back pain and gait problem.   Skin:  Negative for color change and pallor.   Neurological:  Negative for dizziness, facial asymmetry, light-headedness and headaches.       Objective   /76 (BP Location: Left arm)   Pulse 63   Wt 81.1 kg (178 lb 12.8 oz)   BMI 26.60 kg/m²   BSA Body surface area is 1.98 meters squared.      Physical Exam  Constitutional:       General: She is not in acute distress.     Appearance: Normal appearance. She is not toxic-appearing.   HENT:      Head: Normocephalic.      Right Ear: Tympanic membrane, ear canal and external ear normal.      Left Ear: Tympanic membrane, ear canal and external ear normal.      Nose: Nose normal.      Mouth/Throat:      Pharynx: Oropharynx is clear.   Eyes:      Conjunctiva/sclera: Conjunctivae normal.      Pupils: Pupils are equal, round, and reactive to light.   Cardiovascular:      Rate and Rhythm: Normal rate and regular rhythm.      Pulses: Normal pulses.      Heart sounds: Normal heart sounds.   Pulmonary:      Effort: No respiratory distress.      Breath sounds: No wheezing, rhonchi or rales.   Abdominal:      General: Bowel sounds are normal. There is no distension.      Palpations: Abdomen is soft.      Tenderness: There is no abdominal tenderness.   Musculoskeletal:         General: No swelling or  tenderness.      Cervical back: No tenderness.   Skin:     Findings: No lesion or rash.   Neurological:      General: No focal deficit present.      Mental Status: She is alert and oriented to person, place, and time. Mental status is at baseline.      Gait: Gait normal.   Psychiatric:         Mood and Affect: Mood normal.         Behavior: Behavior normal.         Thought Content: Thought content normal.         Judgment: Judgment normal.       Office Visit on 11/27/2023   Component Date Value Ref Range Status    POC HEMOGLOBIN A1c 11/27/2023 6.2  4.2 - 6.5 % Final   Lab on 11/16/2023   Component Date Value Ref Range Status    WBC 11/16/2023 4.8  4.4 - 11.3 x10*3/uL Final    nRBC 11/16/2023 0.0  0.0 - 0.0 /100 WBCs Final    RBC 11/16/2023 4.70  4.00 - 5.20 x10*6/uL Final    Hemoglobin 11/16/2023 13.5  12.0 - 16.0 g/dL Final    Hematocrit 11/16/2023 42.5  36.0 - 46.0 % Final    MCV 11/16/2023 90  80 - 100 fL Final    MCH 11/16/2023 28.7  26.0 - 34.0 pg Final    MCHC 11/16/2023 31.8 (L)  32.0 - 36.0 g/dL Final    RDW 11/16/2023 12.7  11.5 - 14.5 % Final    Platelets 11/16/2023 174  150 - 450 x10*3/uL Final    Neutrophils % 11/16/2023 49.8  40.0 - 80.0 % Final    Immature Granulocytes %, Automated 11/16/2023 0.4  0.0 - 0.9 % Final    Immature Granulocyte Count (IG) includes promyelocytes, myelocytes and metamyelocytes but does not include bands. Percent differential counts (%) should be interpreted in the context of the absolute cell counts (cells/UL).    Lymphocytes % 11/16/2023 33.3  13.0 - 44.0 % Final    Monocytes % 11/16/2023 7.6  2.0 - 10.0 % Final    Eosinophils % 11/16/2023 8.1  0.0 - 6.0 % Final    Basophils % 11/16/2023 0.8  0.0 - 2.0 % Final    Neutrophils Absolute 11/16/2023 2.41  1.60 - 5.50 x10*3/uL Final    Percent differential counts (%) should be interpreted in the context of the absolute cell counts (cells/uL).    Immature Granulocytes Absolute, Au* 11/16/2023 0.02  0.00 - 0.50 x10*3/uL Final     Lymphocytes Absolute 11/16/2023 1.61  0.80 - 3.00 x10*3/uL Final    Monocytes Absolute 11/16/2023 0.37  0.05 - 0.80 x10*3/uL Final    Eosinophils Absolute 11/16/2023 0.39  0.00 - 0.40 x10*3/uL Final    Basophils Absolute 11/16/2023 0.04  0.00 - 0.10 x10*3/uL Final    Glucose 11/16/2023 128 (H)  74 - 99 mg/dL Final    Sodium 11/16/2023 143  136 - 145 mmol/L Final    Potassium 11/16/2023 4.1  3.5 - 5.3 mmol/L Final    Chloride 11/16/2023 108 (H)  98 - 107 mmol/L Final    Bicarbonate 11/16/2023 25  21 - 32 mmol/L Final    Anion Gap 11/16/2023 14  10 - 20 mmol/L Final    Urea Nitrogen 11/16/2023 15  6 - 23 mg/dL Final    Creatinine 11/16/2023 0.82  0.50 - 1.05 mg/dL Final    eGFR 11/16/2023 77  >60 mL/min/1.73m*2 Final    Calculations of estimated GFR are performed using the 2021 CKD-EPI Study Refit equation without the race variable for the IDMS-Traceable creatinine methods.  https://jasn.asnjournals.org/content/early/2021/09/22/ASN.1938661755    Calcium 11/16/2023 9.4  8.6 - 10.6 mg/dL Final    Albumin 11/16/2023 4.5  3.4 - 5.0 g/dL Final    Alkaline Phosphatase 11/16/2023 88  33 - 136 U/L Final    Total Protein 11/16/2023 6.7  6.4 - 8.2 g/dL Final    AST 11/16/2023 15  9 - 39 U/L Final    Bilirubin, Total 11/16/2023 0.5  0.0 - 1.2 mg/dL Final    ALT 11/16/2023 16  7 - 45 U/L Final    Patients treated with Sulfasalazine may generate falsely decreased results for ALT.    Cholesterol 11/16/2023 227 (H)  0 - 199 mg/dL Final          Age      Desirable   Borderline High   High     0-19 Y     0 - 169       170 - 199     >/= 200    20-24 Y     0 - 189       190 - 224     >/= 225         >24 Y     0 - 199       200 - 239     >/= 240   **All ranges are based on fasting samples. Specific   therapeutic targets will vary based on patient-specific   cardiac risk.    Pediatric guidelines reference:Pediatrics 2011, 128(S5).Adult guidelines reference: NCEP ATPIII Guidelines,MAYA 2001, 258:2486-97    Venipuncture immediately after or  during the administration of Metamizole may lead to falsely low results. Testing should be performed immediately prior to Metamizole dosing.    HDL-Cholesterol 11/16/2023 61.6  mg/dL Final      Age       Very Low   Low     Normal    High    0-19 Y    < 35      < 40     40-45     ----  20-24 Y    ----     < 40      >45      ----        >24 Y      ----     < 40     40-60      >60      Cholesterol/HDL Ratio 11/16/2023 3.7   Final      Ref Values  Desirable  < 3.4  High Risk  > 5.0    LDL Calculated 11/16/2023 143 (H)  <=99 mg/dL Final                                Near   Borderline      AGE      Desirable  Optimal    High     High     Very High     0-19 Y     0 - 109     ---    110-129   >/= 130     ----    20-24 Y     0 - 119     ---    120-159   >/= 160     ----      >24 Y     0 -  99   100-129  130-159   160-189     >/=190      VLDL 11/16/2023 22  0 - 40 mg/dL Final    Triglycerides 11/16/2023 112  0 - 149 mg/dL Final       Age         Desirable   Borderline High   High     Very High   0 D-90 D    19 - 174         ----         ----        ----  91 D- 9 Y     0 -  74        75 -  99     >/= 100      ----    10-19 Y     0 -  89        90 - 129     >/= 130      ----    20-24 Y     0 - 114       115 - 149     >/= 150      ----         >24 Y     0 - 149       150 - 199    200- 499    >/= 500    Venipuncture immediately after or during the administration of Metamizole may lead to falsely low results. Testing should be performed immediately prior to Metamizole dosing.    Non HDL Cholesterol 11/16/2023 165 (H)  0 - 149 mg/dL Final          Age       Desirable   Borderline High   High     Very High     0-19 Y     0 - 119       120 - 144     >/= 145    >/= 160    20-24 Y     0 - 149       150 - 189     >/= 190      ----         >24 Y    30 mg/dL above LDL Cholesterol goal      Thyroid Stimulating Hormone 11/16/2023 1.79  0.44 - 3.98 mIU/L Final    Vitamin D, 25-Hydroxy, Total 11/16/2023 25 (L)  30 - 100 ng/mL Final      Current Outpatient Medications on File Prior to Visit   Medication Sig Dispense Refill    amLODIPine (Norvasc) 10 mg tablet Take 1 tablet (10 mg) by mouth once daily. 90 tablet 0    atenolol (Tenormin) 100 mg tablet Take 1 tablet (100 mg) by mouth once daily in the evening. 90 tablet 3     No current facility-administered medications on file prior to visit.     No images are attached to the encounter.            Assessment/Plan   Diagnoses and all orders for this visit:  Essential (primary) hypertension  Anxiety  -     busPIRone (Buspar) 10 mg tablet; Take 1 tablet (10 mg) by mouth 3 times a day.  Patient to continue on amlodipine  Patient to continue increased dose of atenolol 100mg daily  Patient to see us in 2 weeks continue to monitor her bp at home first thing in am, jot down reads bring list in 2 weeks  Start on buspar three times daily as needed for her anxiety  Patient to call if questions or concerns

## 2023-12-18 ENCOUNTER — OFFICE VISIT (OUTPATIENT)
Dept: PRIMARY CARE | Facility: CLINIC | Age: 73
End: 2023-12-18
Payer: MEDICARE

## 2023-12-18 VITALS
WEIGHT: 178.8 LBS | DIASTOLIC BLOOD PRESSURE: 76 MMHG | HEART RATE: 63 BPM | BODY MASS INDEX: 26.6 KG/M2 | SYSTOLIC BLOOD PRESSURE: 142 MMHG

## 2023-12-18 DIAGNOSIS — F41.9 ANXIETY: ICD-10-CM

## 2023-12-18 DIAGNOSIS — I10 ESSENTIAL (PRIMARY) HYPERTENSION: Primary | ICD-10-CM

## 2023-12-18 DIAGNOSIS — Z12.11 ENCOUNTER FOR SCREENING COLONOSCOPY: ICD-10-CM

## 2023-12-18 DIAGNOSIS — Z78.0 POST-MENOPAUSAL: ICD-10-CM

## 2023-12-18 PROCEDURE — 99214 OFFICE O/P EST MOD 30 MIN: CPT | Performed by: FAMILY MEDICINE

## 2023-12-18 PROCEDURE — 1158F ADVNC CARE PLAN TLK DOCD: CPT | Performed by: FAMILY MEDICINE

## 2023-12-18 PROCEDURE — 1159F MED LIST DOCD IN RCRD: CPT | Performed by: FAMILY MEDICINE

## 2023-12-18 PROCEDURE — 3077F SYST BP >= 140 MM HG: CPT | Performed by: FAMILY MEDICINE

## 2023-12-18 PROCEDURE — 1036F TOBACCO NON-USER: CPT | Performed by: FAMILY MEDICINE

## 2023-12-18 PROCEDURE — 3078F DIAST BP <80 MM HG: CPT | Performed by: FAMILY MEDICINE

## 2023-12-18 PROCEDURE — 1160F RVW MEDS BY RX/DR IN RCRD: CPT | Performed by: FAMILY MEDICINE

## 2023-12-18 RX ORDER — BUSPIRONE HYDROCHLORIDE 10 MG/1
10 TABLET ORAL 3 TIMES DAILY
Qty: 90 TABLET | Refills: 11 | Status: SHIPPED | OUTPATIENT
Start: 2023-12-18 | End: 2024-12-17

## 2024-01-09 ASSESSMENT — ENCOUNTER SYMPTOMS
APPETITE CHANGE: 0
FEVER: 0
ARTHRALGIAS: 0
LIGHT-HEADEDNESS: 0
COLOR CHANGE: 0
CHOKING: 0
PALPITATIONS: 0
ACTIVITY CHANGE: 0
FACIAL ASYMMETRY: 0
CHEST TIGHTNESS: 0
DIZZINESS: 0
BACK PAIN: 0
FATIGUE: 0
COUGH: 0
HEADACHES: 0

## 2024-01-09 NOTE — PROGRESS NOTES
Subjective   Patient ID: Janel Mcclendon is a 73 y.o. female who presents for No chief complaint on file..    HPI   Patient is here to follow-up on her blood pressure she was given increased dose of amlodipine stopped her metoprolol continued on Tenormin.    She feels very jittery, anxious and then feels that her blood pressure.     Review of Systems   Constitutional:  Negative for activity change, appetite change, fatigue and fever.   HENT:  Negative for congestion.    Respiratory:  Negative for cough, choking and chest tightness.    Cardiovascular:  Negative for chest pain, palpitations and leg swelling.   Musculoskeletal:  Negative for arthralgias, back pain and gait problem.   Skin:  Negative for color change and pallor.   Neurological:  Negative for dizziness, facial asymmetry, light-headedness and headaches.       Objective   There were no vitals taken for this visit.  BSA There is no height or weight on file to calculate BSA.      Physical Exam  Constitutional:       General: She is not in acute distress.     Appearance: Normal appearance. She is not toxic-appearing.   HENT:      Head: Normocephalic.      Right Ear: Tympanic membrane, ear canal and external ear normal.      Left Ear: Tympanic membrane, ear canal and external ear normal.      Nose: Nose normal.      Mouth/Throat:      Pharynx: Oropharynx is clear.   Eyes:      Conjunctiva/sclera: Conjunctivae normal.      Pupils: Pupils are equal, round, and reactive to light.   Cardiovascular:      Rate and Rhythm: Normal rate and regular rhythm.      Pulses: Normal pulses.      Heart sounds: Normal heart sounds.   Pulmonary:      Effort: No respiratory distress.      Breath sounds: No wheezing, rhonchi or rales.   Abdominal:      General: Bowel sounds are normal. There is no distension.      Palpations: Abdomen is soft.      Tenderness: There is no abdominal tenderness.   Musculoskeletal:         General: No swelling or tenderness.      Cervical back: No  tenderness.   Skin:     Findings: No lesion or rash.   Neurological:      General: No focal deficit present.      Mental Status: She is alert and oriented to person, place, and time. Mental status is at baseline.      Gait: Gait normal.   Psychiatric:         Mood and Affect: Mood normal.         Behavior: Behavior normal.         Thought Content: Thought content normal.         Judgment: Judgment normal.       Office Visit on 11/27/2023   Component Date Value Ref Range Status    POC HEMOGLOBIN A1c 11/27/2023 6.2  4.2 - 6.5 % Final   Lab on 11/16/2023   Component Date Value Ref Range Status    WBC 11/16/2023 4.8  4.4 - 11.3 x10*3/uL Final    nRBC 11/16/2023 0.0  0.0 - 0.0 /100 WBCs Final    RBC 11/16/2023 4.70  4.00 - 5.20 x10*6/uL Final    Hemoglobin 11/16/2023 13.5  12.0 - 16.0 g/dL Final    Hematocrit 11/16/2023 42.5  36.0 - 46.0 % Final    MCV 11/16/2023 90  80 - 100 fL Final    MCH 11/16/2023 28.7  26.0 - 34.0 pg Final    MCHC 11/16/2023 31.8 (L)  32.0 - 36.0 g/dL Final    RDW 11/16/2023 12.7  11.5 - 14.5 % Final    Platelets 11/16/2023 174  150 - 450 x10*3/uL Final    Neutrophils % 11/16/2023 49.8  40.0 - 80.0 % Final    Immature Granulocytes %, Automated 11/16/2023 0.4  0.0 - 0.9 % Final    Immature Granulocyte Count (IG) includes promyelocytes, myelocytes and metamyelocytes but does not include bands. Percent differential counts (%) should be interpreted in the context of the absolute cell counts (cells/UL).    Lymphocytes % 11/16/2023 33.3  13.0 - 44.0 % Final    Monocytes % 11/16/2023 7.6  2.0 - 10.0 % Final    Eosinophils % 11/16/2023 8.1  0.0 - 6.0 % Final    Basophils % 11/16/2023 0.8  0.0 - 2.0 % Final    Neutrophils Absolute 11/16/2023 2.41  1.60 - 5.50 x10*3/uL Final    Percent differential counts (%) should be interpreted in the context of the absolute cell counts (cells/uL).    Immature Granulocytes Absolute, Au* 11/16/2023 0.02  0.00 - 0.50 x10*3/uL Final    Lymphocytes Absolute 11/16/2023 1.61   0.80 - 3.00 x10*3/uL Final    Monocytes Absolute 11/16/2023 0.37  0.05 - 0.80 x10*3/uL Final    Eosinophils Absolute 11/16/2023 0.39  0.00 - 0.40 x10*3/uL Final    Basophils Absolute 11/16/2023 0.04  0.00 - 0.10 x10*3/uL Final    Glucose 11/16/2023 128 (H)  74 - 99 mg/dL Final    Sodium 11/16/2023 143  136 - 145 mmol/L Final    Potassium 11/16/2023 4.1  3.5 - 5.3 mmol/L Final    Chloride 11/16/2023 108 (H)  98 - 107 mmol/L Final    Bicarbonate 11/16/2023 25  21 - 32 mmol/L Final    Anion Gap 11/16/2023 14  10 - 20 mmol/L Final    Urea Nitrogen 11/16/2023 15  6 - 23 mg/dL Final    Creatinine 11/16/2023 0.82  0.50 - 1.05 mg/dL Final    eGFR 11/16/2023 77  >60 mL/min/1.73m*2 Final    Calculations of estimated GFR are performed using the 2021 CKD-EPI Study Refit equation without the race variable for the IDMS-Traceable creatinine methods.  https://jasn.asnjournals.org/content/early/2021/09/22/ASN.4199557078    Calcium 11/16/2023 9.4  8.6 - 10.6 mg/dL Final    Albumin 11/16/2023 4.5  3.4 - 5.0 g/dL Final    Alkaline Phosphatase 11/16/2023 88  33 - 136 U/L Final    Total Protein 11/16/2023 6.7  6.4 - 8.2 g/dL Final    AST 11/16/2023 15  9 - 39 U/L Final    Bilirubin, Total 11/16/2023 0.5  0.0 - 1.2 mg/dL Final    ALT 11/16/2023 16  7 - 45 U/L Final    Patients treated with Sulfasalazine may generate falsely decreased results for ALT.    Cholesterol 11/16/2023 227 (H)  0 - 199 mg/dL Final          Age      Desirable   Borderline High   High     0-19 Y     0 - 169       170 - 199     >/= 200    20-24 Y     0 - 189       190 - 224     >/= 225         >24 Y     0 - 199       200 - 239     >/= 240   **All ranges are based on fasting samples. Specific   therapeutic targets will vary based on patient-specific   cardiac risk.    Pediatric guidelines reference:Pediatrics 2011, 128(S5).Adult guidelines reference: NCEP ATPIII Guidelines,MAYA 2001, 258:2486-97    Venipuncture immediately after or during the administration of  Metamizole may lead to falsely low results. Testing should be performed immediately prior to Metamizole dosing.    HDL-Cholesterol 11/16/2023 61.6  mg/dL Final      Age       Very Low   Low     Normal    High    0-19 Y    < 35      < 40     40-45     ----  20-24 Y    ----     < 40      >45      ----        >24 Y      ----     < 40     40-60      >60      Cholesterol/HDL Ratio 11/16/2023 3.7   Final      Ref Values  Desirable  < 3.4  High Risk  > 5.0    LDL Calculated 11/16/2023 143 (H)  <=99 mg/dL Final                                Near   Borderline      AGE      Desirable  Optimal    High     High     Very High     0-19 Y     0 - 109     ---    110-129   >/= 130     ----    20-24 Y     0 - 119     ---    120-159   >/= 160     ----      >24 Y     0 -  99   100-129  130-159   160-189     >/=190      VLDL 11/16/2023 22  0 - 40 mg/dL Final    Triglycerides 11/16/2023 112  0 - 149 mg/dL Final       Age         Desirable   Borderline High   High     Very High   0 D-90 D    19 - 174         ----         ----        ----  91 D- 9 Y     0 -  74        75 -  99     >/= 100      ----    10-19 Y     0 -  89        90 - 129     >/= 130      ----    20-24 Y     0 - 114       115 - 149     >/= 150      ----         >24 Y     0 - 149       150 - 199    200- 499    >/= 500    Venipuncture immediately after or during the administration of Metamizole may lead to falsely low results. Testing should be performed immediately prior to Metamizole dosing.    Non HDL Cholesterol 11/16/2023 165 (H)  0 - 149 mg/dL Final          Age       Desirable   Borderline High   High     Very High     0-19 Y     0 - 119       120 - 144     >/= 145    >/= 160    20-24 Y     0 - 149       150 - 189     >/= 190      ----         >24 Y    30 mg/dL above LDL Cholesterol goal      Thyroid Stimulating Hormone 11/16/2023 1.79  0.44 - 3.98 mIU/L Final    Vitamin D, 25-Hydroxy, Total 11/16/2023 25 (L)  30 - 100 ng/mL Final     Current Outpatient Medications on  File Prior to Visit   Medication Sig Dispense Refill    amLODIPine (Norvasc) 10 mg tablet Take 1 tablet (10 mg) by mouth once daily. 90 tablet 0    atenolol (Tenormin) 100 mg tablet Take 1 tablet (100 mg) by mouth once daily in the evening. 90 tablet 3    busPIRone (Buspar) 10 mg tablet Take 1 tablet (10 mg) by mouth 3 times a day. 90 tablet 11     No current facility-administered medications on file prior to visit.     No images are attached to the encounter.            Assessment/Plan   Diagnoses and all orders for this visit:  Essential (primary) hypertension  Anxiety  Patient to continue on amlodipine  Patient to continue increased dose of atenolol 100mg daily  Patient to see us in 2 weeks continue to monitor her bp at home first thing in am, roxi down reads bring list in 2 weeks  Start on buspar three times daily as needed for her anxiety  Patient to call if questions or concerns

## 2024-01-10 ENCOUNTER — OFFICE VISIT (OUTPATIENT)
Dept: PRIMARY CARE | Facility: CLINIC | Age: 74
End: 2024-01-10
Payer: MEDICARE

## 2024-01-10 VITALS
HEART RATE: 62 BPM | WEIGHT: 179.2 LBS | BODY MASS INDEX: 26.66 KG/M2 | SYSTOLIC BLOOD PRESSURE: 122 MMHG | DIASTOLIC BLOOD PRESSURE: 60 MMHG

## 2024-01-10 DIAGNOSIS — I10 ESSENTIAL (PRIMARY) HYPERTENSION: Primary | ICD-10-CM

## 2024-01-10 DIAGNOSIS — F41.9 ANXIETY: ICD-10-CM

## 2024-01-10 PROCEDURE — 3074F SYST BP LT 130 MM HG: CPT | Performed by: FAMILY MEDICINE

## 2024-01-10 PROCEDURE — 99214 OFFICE O/P EST MOD 30 MIN: CPT | Performed by: FAMILY MEDICINE

## 2024-01-10 PROCEDURE — 3078F DIAST BP <80 MM HG: CPT | Performed by: FAMILY MEDICINE

## 2024-01-10 PROCEDURE — 1036F TOBACCO NON-USER: CPT | Performed by: FAMILY MEDICINE

## 2024-01-10 PROCEDURE — 1159F MED LIST DOCD IN RCRD: CPT | Performed by: FAMILY MEDICINE

## 2024-01-31 ENCOUNTER — OFFICE VISIT (OUTPATIENT)
Dept: PRIMARY CARE | Facility: CLINIC | Age: 74
End: 2024-01-31
Payer: MEDICARE

## 2024-01-31 VITALS
HEART RATE: 56 BPM | WEIGHT: 179.8 LBS | BODY MASS INDEX: 26.75 KG/M2 | DIASTOLIC BLOOD PRESSURE: 70 MMHG | SYSTOLIC BLOOD PRESSURE: 132 MMHG

## 2024-01-31 DIAGNOSIS — I10 ESSENTIAL (PRIMARY) HYPERTENSION: Primary | ICD-10-CM

## 2024-01-31 DIAGNOSIS — F41.9 ANXIETY: ICD-10-CM

## 2024-01-31 PROCEDURE — 1036F TOBACCO NON-USER: CPT | Performed by: FAMILY MEDICINE

## 2024-01-31 PROCEDURE — 99214 OFFICE O/P EST MOD 30 MIN: CPT | Performed by: FAMILY MEDICINE

## 2024-01-31 PROCEDURE — 3075F SYST BP GE 130 - 139MM HG: CPT | Performed by: FAMILY MEDICINE

## 2024-01-31 PROCEDURE — 3078F DIAST BP <80 MM HG: CPT | Performed by: FAMILY MEDICINE

## 2024-01-31 PROCEDURE — 1160F RVW MEDS BY RX/DR IN RCRD: CPT | Performed by: FAMILY MEDICINE

## 2024-01-31 PROCEDURE — 1159F MED LIST DOCD IN RCRD: CPT | Performed by: FAMILY MEDICINE

## 2024-01-31 ASSESSMENT — ENCOUNTER SYMPTOMS
FACIAL ASYMMETRY: 0
PALPITATIONS: 0
LIGHT-HEADEDNESS: 0
ARTHRALGIAS: 0
CHEST TIGHTNESS: 0
FEVER: 0
COUGH: 0
FATIGUE: 0
DIZZINESS: 0
HEADACHES: 0
COLOR CHANGE: 0
CHOKING: 0
ACTIVITY CHANGE: 0
APPETITE CHANGE: 0
BACK PAIN: 0

## 2024-01-31 ASSESSMENT — PATIENT HEALTH QUESTIONNAIRE - PHQ9
1. LITTLE INTEREST OR PLEASURE IN DOING THINGS: NOT AT ALL
10. IF YOU CHECKED OFF ANY PROBLEMS, HOW DIFFICULT HAVE THESE PROBLEMS MADE IT FOR YOU TO DO YOUR WORK, TAKE CARE OF THINGS AT HOME, OR GET ALONG WITH OTHER PEOPLE: NOT DIFFICULT AT ALL
2. FEELING DOWN, DEPRESSED OR HOPELESS: NOT AT ALL
SUM OF ALL RESPONSES TO PHQ9 QUESTIONS 1 AND 2: 0

## 2024-01-31 NOTE — PROGRESS NOTES
Subjective   Patient ID: Janel Mcclendon is a 73 y.o. female who presents for Follow-up (BP).    HPI   Patient is here to follow-up on her blood pressure she was given increased dose of amlodipine stopped her metoprolol continued on Tenormin.    Her feelings of being jittery and anxious are much better.    Review of Systems   Constitutional:  Negative for activity change, appetite change, fatigue and fever.   HENT:  Negative for congestion.    Respiratory:  Negative for cough, choking and chest tightness.    Cardiovascular:  Negative for chest pain, palpitations and leg swelling.   Musculoskeletal:  Negative for arthralgias, back pain and gait problem.   Skin:  Negative for color change and pallor.   Neurological:  Negative for dizziness, facial asymmetry, light-headedness and headaches.       Objective   /70 (BP Location: Right arm)   Pulse 56   Wt 81.6 kg (179 lb 12.8 oz)   BMI 26.75 kg/m²   BSA Body surface area is 1.99 meters squared.      Physical Exam  Constitutional:       General: She is not in acute distress.     Appearance: Normal appearance. She is not toxic-appearing.   HENT:      Head: Normocephalic.      Right Ear: Tympanic membrane, ear canal and external ear normal.      Left Ear: Tympanic membrane, ear canal and external ear normal.      Nose: Nose normal.      Mouth/Throat:      Pharynx: Oropharynx is clear.   Eyes:      Conjunctiva/sclera: Conjunctivae normal.      Pupils: Pupils are equal, round, and reactive to light.   Cardiovascular:      Rate and Rhythm: Normal rate and regular rhythm.      Pulses: Normal pulses.      Heart sounds: Normal heart sounds.   Pulmonary:      Effort: No respiratory distress.      Breath sounds: No wheezing, rhonchi or rales.   Abdominal:      General: Bowel sounds are normal. There is no distension.      Palpations: Abdomen is soft.      Tenderness: There is no abdominal tenderness.   Musculoskeletal:         General: No swelling or tenderness.       Cervical back: No tenderness.   Skin:     Findings: No lesion or rash.   Neurological:      General: No focal deficit present.      Mental Status: She is alert and oriented to person, place, and time. Mental status is at baseline.      Gait: Gait normal.   Psychiatric:         Mood and Affect: Mood normal.         Behavior: Behavior normal.         Thought Content: Thought content normal.         Judgment: Judgment normal.       Office Visit on 11/27/2023   Component Date Value Ref Range Status    POC HEMOGLOBIN A1c 11/27/2023 6.2  4.2 - 6.5 % Final   Lab on 11/16/2023   Component Date Value Ref Range Status    WBC 11/16/2023 4.8  4.4 - 11.3 x10*3/uL Final    nRBC 11/16/2023 0.0  0.0 - 0.0 /100 WBCs Final    RBC 11/16/2023 4.70  4.00 - 5.20 x10*6/uL Final    Hemoglobin 11/16/2023 13.5  12.0 - 16.0 g/dL Final    Hematocrit 11/16/2023 42.5  36.0 - 46.0 % Final    MCV 11/16/2023 90  80 - 100 fL Final    MCH 11/16/2023 28.7  26.0 - 34.0 pg Final    MCHC 11/16/2023 31.8 (L)  32.0 - 36.0 g/dL Final    RDW 11/16/2023 12.7  11.5 - 14.5 % Final    Platelets 11/16/2023 174  150 - 450 x10*3/uL Final    Neutrophils % 11/16/2023 49.8  40.0 - 80.0 % Final    Immature Granulocytes %, Automated 11/16/2023 0.4  0.0 - 0.9 % Final    Immature Granulocyte Count (IG) includes promyelocytes, myelocytes and metamyelocytes but does not include bands. Percent differential counts (%) should be interpreted in the context of the absolute cell counts (cells/UL).    Lymphocytes % 11/16/2023 33.3  13.0 - 44.0 % Final    Monocytes % 11/16/2023 7.6  2.0 - 10.0 % Final    Eosinophils % 11/16/2023 8.1  0.0 - 6.0 % Final    Basophils % 11/16/2023 0.8  0.0 - 2.0 % Final    Neutrophils Absolute 11/16/2023 2.41  1.60 - 5.50 x10*3/uL Final    Percent differential counts (%) should be interpreted in the context of the absolute cell counts (cells/uL).    Immature Granulocytes Absolute, Au* 11/16/2023 0.02  0.00 - 0.50 x10*3/uL Final    Lymphocytes Absolute  11/16/2023 1.61  0.80 - 3.00 x10*3/uL Final    Monocytes Absolute 11/16/2023 0.37  0.05 - 0.80 x10*3/uL Final    Eosinophils Absolute 11/16/2023 0.39  0.00 - 0.40 x10*3/uL Final    Basophils Absolute 11/16/2023 0.04  0.00 - 0.10 x10*3/uL Final    Glucose 11/16/2023 128 (H)  74 - 99 mg/dL Final    Sodium 11/16/2023 143  136 - 145 mmol/L Final    Potassium 11/16/2023 4.1  3.5 - 5.3 mmol/L Final    Chloride 11/16/2023 108 (H)  98 - 107 mmol/L Final    Bicarbonate 11/16/2023 25  21 - 32 mmol/L Final    Anion Gap 11/16/2023 14  10 - 20 mmol/L Final    Urea Nitrogen 11/16/2023 15  6 - 23 mg/dL Final    Creatinine 11/16/2023 0.82  0.50 - 1.05 mg/dL Final    eGFR 11/16/2023 77  >60 mL/min/1.73m*2 Final    Calculations of estimated GFR are performed using the 2021 CKD-EPI Study Refit equation without the race variable for the IDMS-Traceable creatinine methods.  https://jasn.asnjournals.org/content/early/2021/09/22/ASN.1966903393    Calcium 11/16/2023 9.4  8.6 - 10.6 mg/dL Final    Albumin 11/16/2023 4.5  3.4 - 5.0 g/dL Final    Alkaline Phosphatase 11/16/2023 88  33 - 136 U/L Final    Total Protein 11/16/2023 6.7  6.4 - 8.2 g/dL Final    AST 11/16/2023 15  9 - 39 U/L Final    Bilirubin, Total 11/16/2023 0.5  0.0 - 1.2 mg/dL Final    ALT 11/16/2023 16  7 - 45 U/L Final    Patients treated with Sulfasalazine may generate falsely decreased results for ALT.    Cholesterol 11/16/2023 227 (H)  0 - 199 mg/dL Final          Age      Desirable   Borderline High   High     0-19 Y     0 - 169       170 - 199     >/= 200    20-24 Y     0 - 189       190 - 224     >/= 225         >24 Y     0 - 199       200 - 239     >/= 240   **All ranges are based on fasting samples. Specific   therapeutic targets will vary based on patient-specific   cardiac risk.    Pediatric guidelines reference:Pediatrics 2011, 128(S5).Adult guidelines reference: NCEP ATPIII Guidelines,MAYA 2001, 258:2486-97    Venipuncture immediately after or during the  administration of Metamizole may lead to falsely low results. Testing should be performed immediately prior to Metamizole dosing.    HDL-Cholesterol 11/16/2023 61.6  mg/dL Final      Age       Very Low   Low     Normal    High    0-19 Y    < 35      < 40     40-45     ----  20-24 Y    ----     < 40      >45      ----        >24 Y      ----     < 40     40-60      >60      Cholesterol/HDL Ratio 11/16/2023 3.7   Final      Ref Values  Desirable  < 3.4  High Risk  > 5.0    LDL Calculated 11/16/2023 143 (H)  <=99 mg/dL Final                                Near   Borderline      AGE      Desirable  Optimal    High     High     Very High     0-19 Y     0 - 109     ---    110-129   >/= 130     ----    20-24 Y     0 - 119     ---    120-159   >/= 160     ----      >24 Y     0 -  99   100-129  130-159   160-189     >/=190      VLDL 11/16/2023 22  0 - 40 mg/dL Final    Triglycerides 11/16/2023 112  0 - 149 mg/dL Final       Age         Desirable   Borderline High   High     Very High   0 D-90 D    19 - 174         ----         ----        ----  91 D- 9 Y     0 -  74        75 -  99     >/= 100      ----    10-19 Y     0 -  89        90 - 129     >/= 130      ----    20-24 Y     0 - 114       115 - 149     >/= 150      ----         >24 Y     0 - 149       150 - 199    200- 499    >/= 500    Venipuncture immediately after or during the administration of Metamizole may lead to falsely low results. Testing should be performed immediately prior to Metamizole dosing.    Non HDL Cholesterol 11/16/2023 165 (H)  0 - 149 mg/dL Final          Age       Desirable   Borderline High   High     Very High     0-19 Y     0 - 119       120 - 144     >/= 145    >/= 160    20-24 Y     0 - 149       150 - 189     >/= 190      ----         >24 Y    30 mg/dL above LDL Cholesterol goal      Thyroid Stimulating Hormone 11/16/2023 1.79  0.44 - 3.98 mIU/L Final    Vitamin D, 25-Hydroxy, Total 11/16/2023 25 (L)  30 - 100 ng/mL Final     Current  Outpatient Medications on File Prior to Visit   Medication Sig Dispense Refill    amLODIPine (Norvasc) 10 mg tablet Take 1 tablet (10 mg) by mouth once daily. 90 tablet 0    atenolol (Tenormin) 100 mg tablet Take 1 tablet (100 mg) by mouth once daily in the evening. 90 tablet 3    busPIRone (Buspar) 10 mg tablet Take 1 tablet (10 mg) by mouth 3 times a day. 90 tablet 11     No current facility-administered medications on file prior to visit.     No images are attached to the encounter.            Assessment/Plan   Diagnoses and all orders for this visit:  Essential (primary) hypertension  Anxiety      Patient to continue on amlodipine  Patient to continue increased dose of atenolol 100mg daily  Patient to see us in 2 weeks continue to monitor her bp at home first thing in am, jot down reads bring list in 2 weeks  Start on buspar three times daily as needed for her anxiety  Patient to call if questions or concerns

## 2024-02-13 ASSESSMENT — ENCOUNTER SYMPTOMS
CHEST TIGHTNESS: 0
FACIAL ASYMMETRY: 0
CHOKING: 0
FATIGUE: 0
APPETITE CHANGE: 0
HEADACHES: 0
DIZZINESS: 0
ACTIVITY CHANGE: 0
ARTHRALGIAS: 0
COUGH: 0
PALPITATIONS: 0
LIGHT-HEADEDNESS: 0
FEVER: 0
COLOR CHANGE: 0
BACK PAIN: 0

## 2024-02-13 NOTE — PROGRESS NOTES
Subjective   Patient ID: Janle Mcclendon is a 73 y.o. female who presents for Follow-up (BP).    HPI   Patient is here to follow-up on her blood pressure she was given increased dose of amlodipine.  Blood pressures have been doing well.    Her feelings of being jittery and anxious are much better.    Review of Systems   Constitutional:  Negative for activity change, appetite change, fatigue and fever.   HENT:  Negative for congestion.    Respiratory:  Negative for cough, choking and chest tightness.    Cardiovascular:  Negative for chest pain, palpitations and leg swelling.   Musculoskeletal:  Negative for arthralgias, back pain and gait problem.   Skin:  Negative for color change and pallor.   Neurological:  Negative for dizziness, facial asymmetry, light-headedness and headaches.       Objective   /70 (BP Location: Left arm)   Pulse 59   Wt 82.1 kg (181 lb)   BMI 26.92 kg/m²   BSA Body surface area is 2 meters squared.      Physical Exam  Constitutional:       General: She is not in acute distress.     Appearance: Normal appearance. She is not toxic-appearing.   HENT:      Head: Normocephalic.      Right Ear: Tympanic membrane, ear canal and external ear normal.      Left Ear: Tympanic membrane, ear canal and external ear normal.      Nose: Nose normal.      Mouth/Throat:      Pharynx: Oropharynx is clear.   Eyes:      Conjunctiva/sclera: Conjunctivae normal.      Pupils: Pupils are equal, round, and reactive to light.   Cardiovascular:      Rate and Rhythm: Normal rate and regular rhythm.      Pulses: Normal pulses.      Heart sounds: Normal heart sounds.   Pulmonary:      Effort: No respiratory distress.      Breath sounds: No wheezing, rhonchi or rales.   Abdominal:      General: Bowel sounds are normal. There is no distension.      Palpations: Abdomen is soft.      Tenderness: There is no abdominal tenderness.   Musculoskeletal:         General: No swelling or tenderness.      Cervical back: No  tenderness.   Skin:     Findings: No lesion or rash.   Neurological:      General: No focal deficit present.      Mental Status: She is alert and oriented to person, place, and time. Mental status is at baseline.      Gait: Gait normal.   Psychiatric:         Mood and Affect: Mood normal.         Behavior: Behavior normal.         Thought Content: Thought content normal.         Judgment: Judgment normal.       Office Visit on 11/27/2023   Component Date Value Ref Range Status    POC HEMOGLOBIN A1c 11/27/2023 6.2  4.2 - 6.5 % Final   Lab on 11/16/2023   Component Date Value Ref Range Status    WBC 11/16/2023 4.8  4.4 - 11.3 x10*3/uL Final    nRBC 11/16/2023 0.0  0.0 - 0.0 /100 WBCs Final    RBC 11/16/2023 4.70  4.00 - 5.20 x10*6/uL Final    Hemoglobin 11/16/2023 13.5  12.0 - 16.0 g/dL Final    Hematocrit 11/16/2023 42.5  36.0 - 46.0 % Final    MCV 11/16/2023 90  80 - 100 fL Final    MCH 11/16/2023 28.7  26.0 - 34.0 pg Final    MCHC 11/16/2023 31.8 (L)  32.0 - 36.0 g/dL Final    RDW 11/16/2023 12.7  11.5 - 14.5 % Final    Platelets 11/16/2023 174  150 - 450 x10*3/uL Final    Neutrophils % 11/16/2023 49.8  40.0 - 80.0 % Final    Immature Granulocytes %, Automated 11/16/2023 0.4  0.0 - 0.9 % Final    Immature Granulocyte Count (IG) includes promyelocytes, myelocytes and metamyelocytes but does not include bands. Percent differential counts (%) should be interpreted in the context of the absolute cell counts (cells/UL).    Lymphocytes % 11/16/2023 33.3  13.0 - 44.0 % Final    Monocytes % 11/16/2023 7.6  2.0 - 10.0 % Final    Eosinophils % 11/16/2023 8.1  0.0 - 6.0 % Final    Basophils % 11/16/2023 0.8  0.0 - 2.0 % Final    Neutrophils Absolute 11/16/2023 2.41  1.60 - 5.50 x10*3/uL Final    Percent differential counts (%) should be interpreted in the context of the absolute cell counts (cells/uL).    Immature Granulocytes Absolute, Au* 11/16/2023 0.02  0.00 - 0.50 x10*3/uL Final    Lymphocytes Absolute 11/16/2023 1.61   0.80 - 3.00 x10*3/uL Final    Monocytes Absolute 11/16/2023 0.37  0.05 - 0.80 x10*3/uL Final    Eosinophils Absolute 11/16/2023 0.39  0.00 - 0.40 x10*3/uL Final    Basophils Absolute 11/16/2023 0.04  0.00 - 0.10 x10*3/uL Final    Glucose 11/16/2023 128 (H)  74 - 99 mg/dL Final    Sodium 11/16/2023 143  136 - 145 mmol/L Final    Potassium 11/16/2023 4.1  3.5 - 5.3 mmol/L Final    Chloride 11/16/2023 108 (H)  98 - 107 mmol/L Final    Bicarbonate 11/16/2023 25  21 - 32 mmol/L Final    Anion Gap 11/16/2023 14  10 - 20 mmol/L Final    Urea Nitrogen 11/16/2023 15  6 - 23 mg/dL Final    Creatinine 11/16/2023 0.82  0.50 - 1.05 mg/dL Final    eGFR 11/16/2023 77  >60 mL/min/1.73m*2 Final    Calculations of estimated GFR are performed using the 2021 CKD-EPI Study Refit equation without the race variable for the IDMS-Traceable creatinine methods.  https://jasn.asnjournals.org/content/early/2021/09/22/ASN.6958987178    Calcium 11/16/2023 9.4  8.6 - 10.6 mg/dL Final    Albumin 11/16/2023 4.5  3.4 - 5.0 g/dL Final    Alkaline Phosphatase 11/16/2023 88  33 - 136 U/L Final    Total Protein 11/16/2023 6.7  6.4 - 8.2 g/dL Final    AST 11/16/2023 15  9 - 39 U/L Final    Bilirubin, Total 11/16/2023 0.5  0.0 - 1.2 mg/dL Final    ALT 11/16/2023 16  7 - 45 U/L Final    Patients treated with Sulfasalazine may generate falsely decreased results for ALT.    Cholesterol 11/16/2023 227 (H)  0 - 199 mg/dL Final          Age      Desirable   Borderline High   High     0-19 Y     0 - 169       170 - 199     >/= 200    20-24 Y     0 - 189       190 - 224     >/= 225         >24 Y     0 - 199       200 - 239     >/= 240   **All ranges are based on fasting samples. Specific   therapeutic targets will vary based on patient-specific   cardiac risk.    Pediatric guidelines reference:Pediatrics 2011, 128(S5).Adult guidelines reference: NCEP ATPIII Guidelines,MAYA 2001, 258:2486-97    Venipuncture immediately after or during the administration of  Metamizole may lead to falsely low results. Testing should be performed immediately prior to Metamizole dosing.    HDL-Cholesterol 11/16/2023 61.6  mg/dL Final      Age       Very Low   Low     Normal    High    0-19 Y    < 35      < 40     40-45     ----  20-24 Y    ----     < 40      >45      ----        >24 Y      ----     < 40     40-60      >60      Cholesterol/HDL Ratio 11/16/2023 3.7   Final      Ref Values  Desirable  < 3.4  High Risk  > 5.0    LDL Calculated 11/16/2023 143 (H)  <=99 mg/dL Final                                Near   Borderline      AGE      Desirable  Optimal    High     High     Very High     0-19 Y     0 - 109     ---    110-129   >/= 130     ----    20-24 Y     0 - 119     ---    120-159   >/= 160     ----      >24 Y     0 -  99   100-129  130-159   160-189     >/=190      VLDL 11/16/2023 22  0 - 40 mg/dL Final    Triglycerides 11/16/2023 112  0 - 149 mg/dL Final       Age         Desirable   Borderline High   High     Very High   0 D-90 D    19 - 174         ----         ----        ----  91 D- 9 Y     0 -  74        75 -  99     >/= 100      ----    10-19 Y     0 -  89        90 - 129     >/= 130      ----    20-24 Y     0 - 114       115 - 149     >/= 150      ----         >24 Y     0 - 149       150 - 199    200- 499    >/= 500    Venipuncture immediately after or during the administration of Metamizole may lead to falsely low results. Testing should be performed immediately prior to Metamizole dosing.    Non HDL Cholesterol 11/16/2023 165 (H)  0 - 149 mg/dL Final          Age       Desirable   Borderline High   High     Very High     0-19 Y     0 - 119       120 - 144     >/= 145    >/= 160    20-24 Y     0 - 149       150 - 189     >/= 190      ----         >24 Y    30 mg/dL above LDL Cholesterol goal      Thyroid Stimulating Hormone 11/16/2023 1.79  0.44 - 3.98 mIU/L Final    Vitamin D, 25-Hydroxy, Total 11/16/2023 25 (L)  30 - 100 ng/mL Final     Current Outpatient Medications on  File Prior to Visit   Medication Sig Dispense Refill    amLODIPine (Norvasc) 10 mg tablet Take 1 tablet (10 mg) by mouth once daily. 90 tablet 0    atenolol (Tenormin) 100 mg tablet Take 1 tablet (100 mg) by mouth once daily in the evening. 90 tablet 3    busPIRone (Buspar) 10 mg tablet Take 1 tablet (10 mg) by mouth 3 times a day. 90 tablet 11     No current facility-administered medications on file prior to visit.     No images are attached to the encounter.            Assessment/Plan   Diagnoses and all orders for this visit:  Essential (primary) hypertension      Patient to continue on amlodipine  Patient to continue increased dose of atenolol 100mg daily  Patient to continue to monitor her blood pressures at home  Start on buspar three times daily as needed for her anxiety  Patient to call if questions or concerns

## 2024-02-14 ENCOUNTER — OFFICE VISIT (OUTPATIENT)
Dept: PRIMARY CARE | Facility: CLINIC | Age: 74
End: 2024-02-14
Payer: MEDICARE

## 2024-02-14 VITALS
BODY MASS INDEX: 26.92 KG/M2 | DIASTOLIC BLOOD PRESSURE: 72 MMHG | SYSTOLIC BLOOD PRESSURE: 132 MMHG | HEART RATE: 59 BPM | WEIGHT: 181 LBS

## 2024-02-14 DIAGNOSIS — I10 ESSENTIAL (PRIMARY) HYPERTENSION: Primary | ICD-10-CM

## 2024-02-14 PROCEDURE — 3075F SYST BP GE 130 - 139MM HG: CPT | Performed by: FAMILY MEDICINE

## 2024-02-14 PROCEDURE — 1036F TOBACCO NON-USER: CPT | Performed by: FAMILY MEDICINE

## 2024-02-14 PROCEDURE — 1160F RVW MEDS BY RX/DR IN RCRD: CPT | Performed by: FAMILY MEDICINE

## 2024-02-14 PROCEDURE — 1159F MED LIST DOCD IN RCRD: CPT | Performed by: FAMILY MEDICINE

## 2024-02-14 PROCEDURE — 3078F DIAST BP <80 MM HG: CPT | Performed by: FAMILY MEDICINE

## 2024-02-14 PROCEDURE — 99214 OFFICE O/P EST MOD 30 MIN: CPT | Performed by: FAMILY MEDICINE

## 2024-02-14 ASSESSMENT — PATIENT HEALTH QUESTIONNAIRE - PHQ9
10. IF YOU CHECKED OFF ANY PROBLEMS, HOW DIFFICULT HAVE THESE PROBLEMS MADE IT FOR YOU TO DO YOUR WORK, TAKE CARE OF THINGS AT HOME, OR GET ALONG WITH OTHER PEOPLE: NOT DIFFICULT AT ALL
2. FEELING DOWN, DEPRESSED OR HOPELESS: NOT AT ALL
SUM OF ALL RESPONSES TO PHQ9 QUESTIONS 1 AND 2: 0
1. LITTLE INTEREST OR PLEASURE IN DOING THINGS: NOT AT ALL

## 2024-02-19 DIAGNOSIS — I10 ESSENTIAL (PRIMARY) HYPERTENSION: ICD-10-CM

## 2024-02-20 RX ORDER — AMLODIPINE BESYLATE 10 MG/1
10 TABLET ORAL DAILY
Qty: 90 TABLET | Refills: 0 | Status: SHIPPED | OUTPATIENT
Start: 2024-02-20 | End: 2024-04-25 | Stop reason: SDUPTHER

## 2024-03-04 ENCOUNTER — TELEPHONE (OUTPATIENT)
Dept: PRIMARY CARE | Facility: CLINIC | Age: 74
End: 2024-03-04
Payer: MEDICARE

## 2024-04-25 ENCOUNTER — TELEPHONE (OUTPATIENT)
Dept: PRIMARY CARE | Facility: CLINIC | Age: 74
End: 2024-04-25
Payer: MEDICARE

## 2024-04-25 DIAGNOSIS — I10 ESSENTIAL (PRIMARY) HYPERTENSION: ICD-10-CM

## 2024-04-25 DIAGNOSIS — R73.03 PREDIABETES: ICD-10-CM

## 2024-04-25 DIAGNOSIS — E55.9 VITAMIN D DEFICIENCY: ICD-10-CM

## 2024-04-25 NOTE — TELEPHONE ENCOUNTER
Pt called in requesting a refill on:  amLODIPine (Norvasc) 10 mg tablet   TAKE ONE TABLET BY MOUTH EVERY DAY  Quantity: 90 tablet    Pt would like a phone call when the medication is sent. [phone]      [id number] - [place], [place]   Phone: [phone]

## 2024-04-25 NOTE — TELEPHONE ENCOUNTER
Pt coming in Nov/1/2024 for medicare annual physical.  BW?  Pt will go down stairs for blood work.

## 2024-04-26 DIAGNOSIS — I10 ESSENTIAL (PRIMARY) HYPERTENSION: ICD-10-CM

## 2024-04-26 RX ORDER — AMLODIPINE BESYLATE 10 MG/1
10 TABLET ORAL DAILY
Qty: 90 TABLET | Refills: 0 | Status: SHIPPED | OUTPATIENT
Start: 2024-04-26

## 2024-04-26 RX ORDER — AMLODIPINE BESYLATE 10 MG/1
10 TABLET ORAL DAILY
Qty: 90 TABLET | Refills: 0 | Status: SHIPPED | OUTPATIENT
Start: 2024-04-26 | End: 2024-04-26 | Stop reason: SDUPTHER

## 2024-06-21 ENCOUNTER — OFFICE VISIT (OUTPATIENT)
Dept: PRIMARY CARE | Facility: CLINIC | Age: 74
End: 2024-06-21
Payer: MEDICARE

## 2024-06-21 VITALS
WEIGHT: 179.8 LBS | DIASTOLIC BLOOD PRESSURE: 72 MMHG | SYSTOLIC BLOOD PRESSURE: 130 MMHG | BODY MASS INDEX: 26.75 KG/M2 | TEMPERATURE: 97.6 F | HEART RATE: 59 BPM

## 2024-06-21 DIAGNOSIS — J20.9 ACUTE BRONCHITIS, UNSPECIFIED ORGANISM: Primary | ICD-10-CM

## 2024-06-21 PROCEDURE — 99214 OFFICE O/P EST MOD 30 MIN: CPT | Performed by: FAMILY MEDICINE

## 2024-06-21 PROCEDURE — 1036F TOBACCO NON-USER: CPT | Performed by: FAMILY MEDICINE

## 2024-06-21 PROCEDURE — 3075F SYST BP GE 130 - 139MM HG: CPT | Performed by: FAMILY MEDICINE

## 2024-06-21 PROCEDURE — 3078F DIAST BP <80 MM HG: CPT | Performed by: FAMILY MEDICINE

## 2024-06-21 PROCEDURE — 1159F MED LIST DOCD IN RCRD: CPT | Performed by: FAMILY MEDICINE

## 2024-06-21 PROCEDURE — 1160F RVW MEDS BY RX/DR IN RCRD: CPT | Performed by: FAMILY MEDICINE

## 2024-06-21 RX ORDER — AZITHROMYCIN 250 MG/1
TABLET, FILM COATED ORAL DAILY
Qty: 6 TABLET | Refills: 0 | Status: SHIPPED | OUTPATIENT
Start: 2024-06-21 | End: 2024-06-26

## 2024-06-21 ASSESSMENT — ENCOUNTER SYMPTOMS
ARTHRALGIAS: 0
COLOR CHANGE: 0
FATIGUE: 0
FACIAL ASYMMETRY: 0
BACK PAIN: 0
LIGHT-HEADEDNESS: 0
APPETITE CHANGE: 0
COUGH: 1
ACTIVITY CHANGE: 0
HEADACHES: 0
CHEST TIGHTNESS: 0
CHOKING: 0
DIZZINESS: 0
FEVER: 0
PALPITATIONS: 0

## 2024-06-21 NOTE — PROGRESS NOTES
Subjective   Patient ID: Janel Mcclendon is a 73 y.o. female who presents for Cough (With chest congestion, nasal drainage. X Wed. Did not check for covid. ).    HPI   Patient reports she has had difficulty with cough chest congestion nasal drainage since Wednesday.  Has not checked for COVID.   Review of Systems   Constitutional:  Negative for activity change, appetite change, fatigue and fever.   HENT:  Positive for postnasal drip. Negative for congestion.    Respiratory:  Positive for cough. Negative for choking and chest tightness.    Cardiovascular:  Negative for chest pain, palpitations and leg swelling.   Musculoskeletal:  Negative for arthralgias, back pain and gait problem.   Skin:  Negative for color change and pallor.   Neurological:  Negative for dizziness, facial asymmetry, light-headedness and headaches.       Objective   /72 (BP Location: Left arm)   Pulse 59   Temp 36.4 °C (97.6 °F)   Wt 81.6 kg (179 lb 12.8 oz)   BMI 26.75 kg/m²   BSA Body surface area is 1.99 meters squared.      Physical Exam  Constitutional:       General: She is not in acute distress.     Appearance: Normal appearance. She is not toxic-appearing.   HENT:      Head: Normocephalic.      Right Ear: Tympanic membrane, ear canal and external ear normal.      Left Ear: Tympanic membrane, ear canal and external ear normal.   Eyes:      Conjunctiva/sclera: Conjunctivae normal.      Pupils: Pupils are equal, round, and reactive to light.   Cardiovascular:      Rate and Rhythm: Normal rate and regular rhythm.      Pulses: Normal pulses.      Heart sounds: Normal heart sounds.   Pulmonary:      Effort: No respiratory distress.      Breath sounds: No wheezing, rhonchi or rales.   Musculoskeletal:         General: No swelling or tenderness.   Skin:     Findings: No lesion or rash.   Neurological:      General: No focal deficit present.      Mental Status: She is alert and oriented to person, place, and time. Mental status is at  baseline.      Gait: Gait normal.   Psychiatric:         Mood and Affect: Mood normal.         Behavior: Behavior normal.         Thought Content: Thought content normal.         Judgment: Judgment normal.       Office Visit on 11/27/2023   Component Date Value Ref Range Status    POC HEMOGLOBIN A1c 11/27/2023 6.2  4.2 - 6.5 % Final   Lab on 11/16/2023   Component Date Value Ref Range Status    WBC 11/16/2023 4.8  4.4 - 11.3 x10*3/uL Final    nRBC 11/16/2023 0.0  0.0 - 0.0 /100 WBCs Final    RBC 11/16/2023 4.70  4.00 - 5.20 x10*6/uL Final    Hemoglobin 11/16/2023 13.5  12.0 - 16.0 g/dL Final    Hematocrit 11/16/2023 42.5  36.0 - 46.0 % Final    MCV 11/16/2023 90  80 - 100 fL Final    MCH 11/16/2023 28.7  26.0 - 34.0 pg Final    MCHC 11/16/2023 31.8 (L)  32.0 - 36.0 g/dL Final    RDW 11/16/2023 12.7  11.5 - 14.5 % Final    Platelets 11/16/2023 174  150 - 450 x10*3/uL Final    Neutrophils % 11/16/2023 49.8  40.0 - 80.0 % Final    Immature Granulocytes %, Automated 11/16/2023 0.4  0.0 - 0.9 % Final    Immature Granulocyte Count (IG) includes promyelocytes, myelocytes and metamyelocytes but does not include bands. Percent differential counts (%) should be interpreted in the context of the absolute cell counts (cells/UL).    Lymphocytes % 11/16/2023 33.3  13.0 - 44.0 % Final    Monocytes % 11/16/2023 7.6  2.0 - 10.0 % Final    Eosinophils % 11/16/2023 8.1  0.0 - 6.0 % Final    Basophils % 11/16/2023 0.8  0.0 - 2.0 % Final    Neutrophils Absolute 11/16/2023 2.41  1.60 - 5.50 x10*3/uL Final    Percent differential counts (%) should be interpreted in the context of the absolute cell counts (cells/uL).    Immature Granulocytes Absolute, Au* 11/16/2023 0.02  0.00 - 0.50 x10*3/uL Final    Lymphocytes Absolute 11/16/2023 1.61  0.80 - 3.00 x10*3/uL Final    Monocytes Absolute 11/16/2023 0.37  0.05 - 0.80 x10*3/uL Final    Eosinophils Absolute 11/16/2023 0.39  0.00 - 0.40 x10*3/uL Final    Basophils Absolute 11/16/2023 0.04  0.00  - 0.10 x10*3/uL Final    Glucose 11/16/2023 128 (H)  74 - 99 mg/dL Final    Sodium 11/16/2023 143  136 - 145 mmol/L Final    Potassium 11/16/2023 4.1  3.5 - 5.3 mmol/L Final    Chloride 11/16/2023 108 (H)  98 - 107 mmol/L Final    Bicarbonate 11/16/2023 25  21 - 32 mmol/L Final    Anion Gap 11/16/2023 14  10 - 20 mmol/L Final    Urea Nitrogen 11/16/2023 15  6 - 23 mg/dL Final    Creatinine 11/16/2023 0.82  0.50 - 1.05 mg/dL Final    eGFR 11/16/2023 77  >60 mL/min/1.73m*2 Final    Calculations of estimated GFR are performed using the 2021 CKD-EPI Study Refit equation without the race variable for the IDMS-Traceable creatinine methods.  https://jasn.asnjournals.org/content/early/2021/09/22/ASN.9531881658    Calcium 11/16/2023 9.4  8.6 - 10.6 mg/dL Final    Albumin 11/16/2023 4.5  3.4 - 5.0 g/dL Final    Alkaline Phosphatase 11/16/2023 88  33 - 136 U/L Final    Total Protein 11/16/2023 6.7  6.4 - 8.2 g/dL Final    AST 11/16/2023 15  9 - 39 U/L Final    Bilirubin, Total 11/16/2023 0.5  0.0 - 1.2 mg/dL Final    ALT 11/16/2023 16  7 - 45 U/L Final    Patients treated with Sulfasalazine may generate falsely decreased results for ALT.    Cholesterol 11/16/2023 227 (H)  0 - 199 mg/dL Final          Age      Desirable   Borderline High   High     0-19 Y     0 - 169       170 - 199     >/= 200    20-24 Y     0 - 189       190 - 224     >/= 225         >24 Y     0 - 199       200 - 239     >/= 240   **All ranges are based on fasting samples. Specific   therapeutic targets will vary based on patient-specific   cardiac risk.    Pediatric guidelines reference:Pediatrics 2011, 128(S5).Adult guidelines reference: NCEP ATPIII Guidelines,MAYA 2001, 258:2486-97    Venipuncture immediately after or during the administration of Metamizole may lead to falsely low results. Testing should be performed immediately prior to Metamizole dosing.    HDL-Cholesterol 11/16/2023 61.6  mg/dL Final      Age       Very Low   Low     Normal    High     0-19 Y    < 35      < 40     40-45     ----  20-24 Y    ----     < 40      >45      ----        >24 Y      ----     < 40     40-60      >60      Cholesterol/HDL Ratio 11/16/2023 3.7   Final      Ref Values  Desirable  < 3.4  High Risk  > 5.0    LDL Calculated 11/16/2023 143 (H)  <=99 mg/dL Final                                Near   Borderline      AGE      Desirable  Optimal    High     High     Very High     0-19 Y     0 - 109     ---    110-129   >/= 130     ----    20-24 Y     0 - 119     ---    120-159   >/= 160     ----      >24 Y     0 -  99   100-129  130-159   160-189     >/=190      VLDL 11/16/2023 22  0 - 40 mg/dL Final    Triglycerides 11/16/2023 112  0 - 149 mg/dL Final       Age         Desirable   Borderline High   High     Very High   0 D-90 D    19 - 174         ----         ----        ----  91 D- 9 Y     0 -  74        75 -  99     >/= 100      ----    10-19 Y     0 -  89        90 - 129     >/= 130      ----    20-24 Y     0 - 114       115 - 149     >/= 150      ----         >24 Y     0 - 149       150 - 199    200- 499    >/= 500    Venipuncture immediately after or during the administration of Metamizole may lead to falsely low results. Testing should be performed immediately prior to Metamizole dosing.    Non HDL Cholesterol 11/16/2023 165 (H)  0 - 149 mg/dL Final          Age       Desirable   Borderline High   High     Very High     0-19 Y     0 - 119       120 - 144     >/= 145    >/= 160    20-24 Y     0 - 149       150 - 189     >/= 190      ----         >24 Y    30 mg/dL above LDL Cholesterol goal      Thyroid Stimulating Hormone 11/16/2023 1.79  0.44 - 3.98 mIU/L Final    Vitamin D, 25-Hydroxy, Total 11/16/2023 25 (L)  30 - 100 ng/mL Final     Current Outpatient Medications on File Prior to Visit   Medication Sig Dispense Refill    amLODIPine (Norvasc) 10 mg tablet Take 1 tablet (10 mg) by mouth once daily. 90 tablet 0    atenolol (Tenormin) 100 mg tablet Take 1 tablet (100 mg) by  mouth once daily in the evening. 90 tablet 3    busPIRone (Buspar) 10 mg tablet Take 1 tablet (10 mg) by mouth 3 times a day. 90 tablet 11     No current facility-administered medications on file prior to visit.     No images are attached to the encounter.            Assessment/Plan   Diagnoses and all orders for this visit:  Acute bronchitis, unspecified organism  -     azithromycin (Zithromax) 250 mg tablet; Take 2 tablets (500 mg) by mouth once daily for 1 day, THEN 1 tablet (250 mg) once daily for 4 days. Take 2 tabs (500 mg) by mouth today, than 1 daily for 4 days..    1.  Patient to start on antibiotics  2.  Patient to call if questions or concerns

## 2024-08-05 ENCOUNTER — TELEMEDICINE (OUTPATIENT)
Dept: PRIMARY CARE | Facility: CLINIC | Age: 74
End: 2024-08-05
Payer: MEDICARE

## 2024-08-05 DIAGNOSIS — U07.1 COVID-19: Primary | ICD-10-CM

## 2024-08-05 PROCEDURE — 1036F TOBACCO NON-USER: CPT | Performed by: FAMILY MEDICINE

## 2024-08-05 PROCEDURE — 99214 OFFICE O/P EST MOD 30 MIN: CPT | Performed by: FAMILY MEDICINE

## 2024-08-05 PROCEDURE — 1160F RVW MEDS BY RX/DR IN RCRD: CPT | Performed by: FAMILY MEDICINE

## 2024-08-05 PROCEDURE — 1159F MED LIST DOCD IN RCRD: CPT | Performed by: FAMILY MEDICINE

## 2024-08-05 ASSESSMENT — ENCOUNTER SYMPTOMS
BACK PAIN: 0
FATIGUE: 0
CHEST TIGHTNESS: 0
ARTHRALGIAS: 0
DIZZINESS: 0
COUGH: 1
CHOKING: 0
FEVER: 0
APPETITE CHANGE: 0
LIGHT-HEADEDNESS: 0
PALPITATIONS: 0
COLOR CHANGE: 0
FACIAL ASYMMETRY: 0
ACTIVITY CHANGE: 0
HEADACHES: 0

## 2024-08-05 NOTE — PROGRESS NOTES
Subjective   Patient ID: Janel Mcclendon is a 74 y.o. female who presents for Cough (With chest congestion, nasal drainage, sore throat. X Friday evening. Pos for covid this morning with an  home test. ).  I performed this visit using real-time telehealth tools, including an audio/video connection between Janel Mcclendon location: [place]  and Nelly Tay DO location : [place]    HPI   Patient has positive covid this am. She has had nasal congestion. She has sore throat.     Review of Systems   Constitutional:  Negative for activity change, appetite change, fatigue and fever.   HENT:  Negative for congestion.    Respiratory:  Positive for cough. Negative for choking and chest tightness.    Cardiovascular:  Negative for chest pain, palpitations and leg swelling.   Musculoskeletal:  Negative for arthralgias, back pain and gait problem.   Skin:  Negative for color change and pallor.   Neurological:  Negative for dizziness, facial asymmetry, light-headedness and headaches.       Objective   There were no vitals taken for this visit.  BSA There is no height or weight on file to calculate BSA.      Physical Exam  Constitutional:       Appearance: Normal appearance.   Neurological:      Mental Status: She is alert.       Office Visit on 2023   Component Date Value Ref Range Status    POC HEMOGLOBIN A1c 2023 6.2  4.2 - 6.5 % Final   Lab on 2023   Component Date Value Ref Range Status    WBC 2023 4.8  4.4 - 11.3 x10*3/uL Final    nRBC 2023 0.0  0.0 - 0.0 /100 WBCs Final    RBC 2023 4.70  4.00 - 5.20 x10*6/uL Final    Hemoglobin 2023 13.5  12.0 - 16.0 g/dL Final    Hematocrit 2023 42.5  36.0 - 46.0 % Final    MCV 2023 90  80 - 100 fL Final    MCH 2023 28.7  26.0 - 34.0 pg Final    MCHC 2023 31.8 (L)  32.0 - 36.0 g/dL Final    RDW 2023 12.7  11.5 - 14.5 % Final    Platelets 2023 174  150 - 450 x10*3/uL Final    Neutrophils % 2023 49.8  40.0  - 80.0 % Final    Immature Granulocytes %, Automated 11/16/2023 0.4  0.0 - 0.9 % Final    Immature Granulocyte Count (IG) includes promyelocytes, myelocytes and metamyelocytes but does not include bands. Percent differential counts (%) should be interpreted in the context of the absolute cell counts (cells/UL).    Lymphocytes % 11/16/2023 33.3  13.0 - 44.0 % Final    Monocytes % 11/16/2023 7.6  2.0 - 10.0 % Final    Eosinophils % 11/16/2023 8.1  0.0 - 6.0 % Final    Basophils % 11/16/2023 0.8  0.0 - 2.0 % Final    Neutrophils Absolute 11/16/2023 2.41  1.60 - 5.50 x10*3/uL Final    Percent differential counts (%) should be interpreted in the context of the absolute cell counts (cells/uL).    Immature Granulocytes Absolute, Au* 11/16/2023 0.02  0.00 - 0.50 x10*3/uL Final    Lymphocytes Absolute 11/16/2023 1.61  0.80 - 3.00 x10*3/uL Final    Monocytes Absolute 11/16/2023 0.37  0.05 - 0.80 x10*3/uL Final    Eosinophils Absolute 11/16/2023 0.39  0.00 - 0.40 x10*3/uL Final    Basophils Absolute 11/16/2023 0.04  0.00 - 0.10 x10*3/uL Final    Glucose 11/16/2023 128 (H)  74 - 99 mg/dL Final    Sodium 11/16/2023 143  136 - 145 mmol/L Final    Potassium 11/16/2023 4.1  3.5 - 5.3 mmol/L Final    Chloride 11/16/2023 108 (H)  98 - 107 mmol/L Final    Bicarbonate 11/16/2023 25  21 - 32 mmol/L Final    Anion Gap 11/16/2023 14  10 - 20 mmol/L Final    Urea Nitrogen 11/16/2023 15  6 - 23 mg/dL Final    Creatinine 11/16/2023 0.82  0.50 - 1.05 mg/dL Final    eGFR 11/16/2023 77  >60 mL/min/1.73m*2 Final    Calculations of estimated GFR are performed using the 2021 CKD-EPI Study Refit equation without the race variable for the IDMS-Traceable creatinine methods.  https://jasn.asnjournals.org/content/early/2021/09/22/ASN.7087352887    Calcium 11/16/2023 9.4  8.6 - 10.6 mg/dL Final    Albumin 11/16/2023 4.5  3.4 - 5.0 g/dL Final    Alkaline Phosphatase 11/16/2023 88  33 - 136 U/L Final    Total Protein 11/16/2023 6.7  6.4 - 8.2 g/dL Final     AST 11/16/2023 15  9 - 39 U/L Final    Bilirubin, Total 11/16/2023 0.5  0.0 - 1.2 mg/dL Final    ALT 11/16/2023 16  7 - 45 U/L Final    Patients treated with Sulfasalazine may generate falsely decreased results for ALT.    Cholesterol 11/16/2023 227 (H)  0 - 199 mg/dL Final          Age      Desirable   Borderline High   High     0-19 Y     0 - 169       170 - 199     >/= 200    20-24 Y     0 - 189       190 - 224     >/= 225         >24 Y     0 - 199       200 - 239     >/= 240   **All ranges are based on fasting samples. Specific   therapeutic targets will vary based on patient-specific   cardiac risk.    Pediatric guidelines reference:Pediatrics 2011, 128(S5).Adult guidelines reference: NCEP ATPIII Guidelines,MAYA 2001, 258:2486-97    Venipuncture immediately after or during the administration of Metamizole may lead to falsely low results. Testing should be performed immediately prior to Metamizole dosing.    HDL-Cholesterol 11/16/2023 61.6  mg/dL Final      Age       Very Low   Low     Normal    High    0-19 Y    < 35      < 40     40-45     ----  20-24 Y    ----     < 40      >45      ----        >24 Y      ----     < 40     40-60      >60      Cholesterol/HDL Ratio 11/16/2023 3.7   Final      Ref Values  Desirable  < 3.4  High Risk  > 5.0    LDL Calculated 11/16/2023 143 (H)  <=99 mg/dL Final                                Near   Borderline      AGE      Desirable  Optimal    High     High     Very High     0-19 Y     0 - 109     ---    110-129   >/= 130     ----    20-24 Y     0 - 119     ---    120-159   >/= 160     ----      >24 Y     0 -  99   100-129  130-159   160-189     >/=190      VLDL 11/16/2023 22  0 - 40 mg/dL Final    Triglycerides 11/16/2023 112  0 - 149 mg/dL Final       Age         Desirable   Borderline High   High     Very High   0 D-90 D    19 - 174         ----         ----        ----  91 D- 9 Y     0 -  74        75 -  99     >/= 100      ----    10-19 Y     0 -  89        90 - 129      >/= 130      ----    20-24 Y     0 - 114       115 - 149     >/= 150      ----         >24 Y     0 - 149       150 - 199    200- 499    >/= 500    Venipuncture immediately after or during the administration of Metamizole may lead to falsely low results. Testing should be performed immediately prior to Metamizole dosing.    Non HDL Cholesterol 11/16/2023 165 (H)  0 - 149 mg/dL Final          Age       Desirable   Borderline High   High     Very High     0-19 Y     0 - 119       120 - 144     >/= 145    >/= 160    20-24 Y     0 - 149       150 - 189     >/= 190      ----         >24 Y    30 mg/dL above LDL Cholesterol goal      Thyroid Stimulating Hormone 11/16/2023 1.79  0.44 - 3.98 mIU/L Final    Vitamin D, 25-Hydroxy, Total 11/16/2023 25 (L)  30 - 100 ng/mL Final     Current Outpatient Medications on File Prior to Visit   Medication Sig Dispense Refill    amLODIPine (Norvasc) 10 mg tablet Take 1 tablet (10 mg) by mouth once daily. 90 tablet 0    atenolol (Tenormin) 100 mg tablet Take 1 tablet (100 mg) by mouth once daily in the evening. 90 tablet 3    busPIRone (Buspar) 10 mg tablet Take 1 tablet (10 mg) by mouth 3 times a day. 90 tablet 11     No current facility-administered medications on file prior to visit.     No images are attached to the encounter.        Assessment/Plan   Diagnoses and all orders for this visit:  COVID-19    Patient was offered paxlovid and refused at this office visit  Patient to call if questions or concerns

## 2024-08-08 ENCOUNTER — TELEPHONE (OUTPATIENT)
Dept: PRIMARY CARE | Facility: CLINIC | Age: 74
End: 2024-08-08
Payer: MEDICARE

## 2024-08-08 DIAGNOSIS — I10 ESSENTIAL (PRIMARY) HYPERTENSION: ICD-10-CM

## 2024-08-08 RX ORDER — AMLODIPINE BESYLATE 10 MG/1
10 TABLET ORAL DAILY
Qty: 90 TABLET | Refills: 0 | Status: SHIPPED | OUTPATIENT
Start: 2024-08-08

## 2024-08-08 NOTE — TELEPHONE ENCOUNTER
Patient called in for a refill on:    amLODIPine (Norvasc) 10 mg tablet   Take 1 tablet (10 mg) by mouth once daily.   Quantity: 90    [id number] - [place],   Phone: [phone]  Fax: [phone]

## 2024-10-22 ENCOUNTER — LAB (OUTPATIENT)
Dept: LAB | Facility: LAB | Age: 74
End: 2024-10-22
Payer: MEDICARE

## 2024-10-22 DIAGNOSIS — I10 ESSENTIAL (PRIMARY) HYPERTENSION: ICD-10-CM

## 2024-10-22 DIAGNOSIS — R73.03 PREDIABETES: ICD-10-CM

## 2024-10-22 DIAGNOSIS — E55.9 VITAMIN D DEFICIENCY: ICD-10-CM

## 2024-10-22 LAB
25(OH)D3 SERPL-MCNC: 41 NG/ML (ref 30–100)
ALBUMIN SERPL BCP-MCNC: 4.2 G/DL (ref 3.4–5)
ALP SERPL-CCNC: 95 U/L (ref 33–136)
ALT SERPL W P-5'-P-CCNC: 18 U/L (ref 7–45)
ANION GAP SERPL CALC-SCNC: 11 MMOL/L (ref 10–20)
AST SERPL W P-5'-P-CCNC: 19 U/L (ref 9–39)
BASOPHILS # BLD AUTO: 0.03 X10*3/UL (ref 0–0.1)
BASOPHILS NFR BLD AUTO: 0.6 %
BILIRUB SERPL-MCNC: 0.6 MG/DL (ref 0–1.2)
BUN SERPL-MCNC: 9 MG/DL (ref 6–23)
CALCIUM SERPL-MCNC: 9.5 MG/DL (ref 8.6–10.6)
CHLORIDE SERPL-SCNC: 108 MMOL/L (ref 98–107)
CHOLEST SERPL-MCNC: 239 MG/DL (ref 0–199)
CHOLESTEROL/HDL RATIO: 4.1
CO2 SERPL-SCNC: 28 MMOL/L (ref 21–32)
CREAT SERPL-MCNC: 0.91 MG/DL (ref 0.5–1.05)
EGFRCR SERPLBLD CKD-EPI 2021: 67 ML/MIN/1.73M*2
EOSINOPHIL # BLD AUTO: 0.3 X10*3/UL (ref 0–0.4)
EOSINOPHIL NFR BLD AUTO: 5.8 %
ERYTHROCYTE [DISTWIDTH] IN BLOOD BY AUTOMATED COUNT: 12.8 % (ref 11.5–14.5)
EST. AVERAGE GLUCOSE BLD GHB EST-MCNC: 146 MG/DL
GLUCOSE SERPL-MCNC: 158 MG/DL (ref 74–99)
HBA1C MFR BLD: 6.7 %
HCT VFR BLD AUTO: 39.8 % (ref 36–46)
HDLC SERPL-MCNC: 57.7 MG/DL
HGB BLD-MCNC: 13.5 G/DL (ref 12–16)
IMM GRANULOCYTES # BLD AUTO: 0.02 X10*3/UL (ref 0–0.5)
IMM GRANULOCYTES NFR BLD AUTO: 0.4 % (ref 0–0.9)
LDLC SERPL CALC-MCNC: 148 MG/DL
LYMPHOCYTES # BLD AUTO: 1.74 X10*3/UL (ref 0.8–3)
LYMPHOCYTES NFR BLD AUTO: 33.9 %
MCH RBC QN AUTO: 29 PG (ref 26–34)
MCHC RBC AUTO-ENTMCNC: 33.9 G/DL (ref 32–36)
MCV RBC AUTO: 86 FL (ref 80–100)
MONOCYTES # BLD AUTO: 0.4 X10*3/UL (ref 0.05–0.8)
MONOCYTES NFR BLD AUTO: 7.8 %
NEUTROPHILS # BLD AUTO: 2.65 X10*3/UL (ref 1.6–5.5)
NEUTROPHILS NFR BLD AUTO: 51.5 %
NON HDL CHOLESTEROL: 181 MG/DL (ref 0–149)
NRBC BLD-RTO: 0 /100 WBCS (ref 0–0)
PLATELET # BLD AUTO: 190 X10*3/UL (ref 150–450)
POTASSIUM SERPL-SCNC: 4.3 MMOL/L (ref 3.5–5.3)
PROT SERPL-MCNC: 6.6 G/DL (ref 6.4–8.2)
RBC # BLD AUTO: 4.65 X10*6/UL (ref 4–5.2)
SODIUM SERPL-SCNC: 143 MMOL/L (ref 136–145)
TRIGL SERPL-MCNC: 167 MG/DL (ref 0–149)
TSH SERPL-ACNC: 2.69 MIU/L (ref 0.44–3.98)
VLDL: 33 MG/DL (ref 0–40)
WBC # BLD AUTO: 5.1 X10*3/UL (ref 4.4–11.3)

## 2024-10-22 PROCEDURE — 36415 COLL VENOUS BLD VENIPUNCTURE: CPT

## 2024-10-22 PROCEDURE — 84443 ASSAY THYROID STIM HORMONE: CPT

## 2024-10-22 PROCEDURE — 80053 COMPREHEN METABOLIC PANEL: CPT

## 2024-10-22 PROCEDURE — 82306 VITAMIN D 25 HYDROXY: CPT

## 2024-10-22 PROCEDURE — 83036 HEMOGLOBIN GLYCOSYLATED A1C: CPT

## 2024-10-22 PROCEDURE — 85025 COMPLETE CBC W/AUTO DIFF WBC: CPT

## 2024-10-22 PROCEDURE — 80061 LIPID PANEL: CPT

## 2024-11-01 ENCOUNTER — APPOINTMENT (OUTPATIENT)
Dept: PRIMARY CARE | Facility: CLINIC | Age: 74
End: 2024-11-01
Payer: MEDICARE

## 2024-11-01 VITALS
SYSTOLIC BLOOD PRESSURE: 124 MMHG | HEART RATE: 56 BPM | WEIGHT: 182.6 LBS | HEIGHT: 68 IN | DIASTOLIC BLOOD PRESSURE: 66 MMHG | BODY MASS INDEX: 27.68 KG/M2

## 2024-11-01 DIAGNOSIS — I73.9 PVD (PERIPHERAL VASCULAR DISEASE) (CMS-HCC): ICD-10-CM

## 2024-11-01 DIAGNOSIS — Z71.89 ADVANCE DIRECTIVE DISCUSSED WITH PATIENT: ICD-10-CM

## 2024-11-01 DIAGNOSIS — Z00.00 ROUTINE GENERAL MEDICAL EXAMINATION AT HEALTH CARE FACILITY: ICD-10-CM

## 2024-11-01 DIAGNOSIS — L40.50 PSORIATIC ARTHRITIS (MULTI): ICD-10-CM

## 2024-11-01 DIAGNOSIS — Z12.11 SCREENING FOR COLORECTAL CANCER: ICD-10-CM

## 2024-11-01 DIAGNOSIS — I10 ESSENTIAL (PRIMARY) HYPERTENSION: ICD-10-CM

## 2024-11-01 DIAGNOSIS — Z00.00 HEALTHCARE MAINTENANCE: Primary | ICD-10-CM

## 2024-11-01 DIAGNOSIS — I34.1 MITRAL VALVE PROLAPSE: ICD-10-CM

## 2024-11-01 DIAGNOSIS — E11.9 TYPE 2 DIABETES MELLITUS WITHOUT COMPLICATION, UNSPECIFIED WHETHER LONG TERM INSULIN USE (MULTI): ICD-10-CM

## 2024-11-01 DIAGNOSIS — E55.9 VITAMIN D DEFICIENCY: ICD-10-CM

## 2024-11-01 DIAGNOSIS — Z71.89 CARDIAC RISK COUNSELING: ICD-10-CM

## 2024-11-01 DIAGNOSIS — Z12.12 SCREENING FOR COLORECTAL CANCER: ICD-10-CM

## 2024-11-01 DIAGNOSIS — H61.23 BILATERAL IMPACTED CERUMEN: ICD-10-CM

## 2024-11-01 PROBLEM — L40.9 PSORIASIS: Status: RESOLVED | Noted: 2023-11-14 | Resolved: 2024-11-01

## 2024-11-01 PROBLEM — M89.9 DISORDER OF BONE: Status: ACTIVE | Noted: 2023-11-14

## 2024-11-01 PROBLEM — R73.03 PREDIABETES: Status: RESOLVED | Noted: 2023-11-27 | Resolved: 2024-11-01

## 2024-11-01 PROCEDURE — 3050F LDL-C >= 130 MG/DL: CPT | Performed by: FAMILY MEDICINE

## 2024-11-01 PROCEDURE — 99214 OFFICE O/P EST MOD 30 MIN: CPT | Performed by: FAMILY MEDICINE

## 2024-11-01 PROCEDURE — G0446 INTENS BEHAVE THER CARDIO DX: HCPCS | Performed by: FAMILY MEDICINE

## 2024-11-01 PROCEDURE — 99497 ADVNCD CARE PLAN 30 MIN: CPT | Performed by: FAMILY MEDICINE

## 2024-11-01 PROCEDURE — G0439 PPPS, SUBSEQ VISIT: HCPCS | Performed by: FAMILY MEDICINE

## 2024-11-01 PROCEDURE — 1158F ADVNC CARE PLAN TLK DOCD: CPT | Performed by: FAMILY MEDICINE

## 2024-11-01 PROCEDURE — 3044F HG A1C LEVEL LT 7.0%: CPT | Performed by: FAMILY MEDICINE

## 2024-11-01 PROCEDURE — 1170F FXNL STATUS ASSESSED: CPT | Performed by: FAMILY MEDICINE

## 2024-11-01 PROCEDURE — 99397 PER PM REEVAL EST PAT 65+ YR: CPT | Performed by: FAMILY MEDICINE

## 2024-11-01 PROCEDURE — 3078F DIAST BP <80 MM HG: CPT | Performed by: FAMILY MEDICINE

## 2024-11-01 PROCEDURE — 3074F SYST BP LT 130 MM HG: CPT | Performed by: FAMILY MEDICINE

## 2024-11-01 PROCEDURE — 1123F ACP DISCUSS/DSCN MKR DOCD: CPT | Performed by: FAMILY MEDICINE

## 2024-11-01 PROCEDURE — 1160F RVW MEDS BY RX/DR IN RCRD: CPT | Performed by: FAMILY MEDICINE

## 2024-11-01 PROCEDURE — 3008F BODY MASS INDEX DOCD: CPT | Performed by: FAMILY MEDICINE

## 2024-11-01 PROCEDURE — 1036F TOBACCO NON-USER: CPT | Performed by: FAMILY MEDICINE

## 2024-11-01 PROCEDURE — 1159F MED LIST DOCD IN RCRD: CPT | Performed by: FAMILY MEDICINE

## 2024-11-01 RX ORDER — AMLODIPINE BESYLATE 10 MG/1
10 TABLET ORAL DAILY
Qty: 90 TABLET | Refills: 3 | Status: SHIPPED | OUTPATIENT
Start: 2024-11-01

## 2024-11-01 ASSESSMENT — ENCOUNTER SYMPTOMS
FATIGUE: 0
BACK PAIN: 0
FACIAL ASYMMETRY: 0
LIGHT-HEADEDNESS: 0
DIZZINESS: 0
PALPITATIONS: 0
CHOKING: 0
HEADACHES: 0
APPETITE CHANGE: 0
ACTIVITY CHANGE: 0
CHEST TIGHTNESS: 0
COLOR CHANGE: 0
DEPRESSION: 0
FEVER: 0
OCCASIONAL FEELINGS OF UNSTEADINESS: 0
LOSS OF SENSATION IN FEET: 0
COUGH: 0
ARTHRALGIAS: 0

## 2024-11-01 ASSESSMENT — PATIENT HEALTH QUESTIONNAIRE - PHQ9
10. IF YOU CHECKED OFF ANY PROBLEMS, HOW DIFFICULT HAVE THESE PROBLEMS MADE IT FOR YOU TO DO YOUR WORK, TAKE CARE OF THINGS AT HOME, OR GET ALONG WITH OTHER PEOPLE: NOT DIFFICULT AT ALL
SUM OF ALL RESPONSES TO PHQ9 QUESTIONS 1 AND 2: 0
1. LITTLE INTEREST OR PLEASURE IN DOING THINGS: NOT AT ALL
2. FEELING DOWN, DEPRESSED OR HOPELESS: NOT AT ALL

## 2024-11-01 ASSESSMENT — ACTIVITIES OF DAILY LIVING (ADL)
MANAGING_FINANCES: INDEPENDENT
DRESSING: INDEPENDENT
TAKING_MEDICATION: INDEPENDENT
DOING_HOUSEWORK: INDEPENDENT
BATHING: INDEPENDENT
GROCERY_SHOPPING: INDEPENDENT

## 2024-11-14 LAB — NONINV COLON CA DNA+OCC BLD SCRN STL QL: NEGATIVE

## 2025-01-31 ENCOUNTER — APPOINTMENT (OUTPATIENT)
Dept: PRIMARY CARE | Facility: CLINIC | Age: 75
End: 2025-01-31
Payer: MEDICARE

## 2025-01-31 VITALS
SYSTOLIC BLOOD PRESSURE: 122 MMHG | DIASTOLIC BLOOD PRESSURE: 74 MMHG | BODY MASS INDEX: 26.72 KG/M2 | HEART RATE: 64 BPM | WEIGHT: 177 LBS

## 2025-01-31 DIAGNOSIS — L40.50 PSORIATIC ARTHRITIS (MULTI): Primary | ICD-10-CM

## 2025-01-31 DIAGNOSIS — E11.9 TYPE 2 DIABETES MELLITUS WITHOUT COMPLICATION, UNSPECIFIED WHETHER LONG TERM INSULIN USE (MULTI): ICD-10-CM

## 2025-01-31 DIAGNOSIS — I73.9 PVD (PERIPHERAL VASCULAR DISEASE) (CMS-HCC): ICD-10-CM

## 2025-01-31 DIAGNOSIS — I10 ESSENTIAL (PRIMARY) HYPERTENSION: ICD-10-CM

## 2025-01-31 PROCEDURE — 1159F MED LIST DOCD IN RCRD: CPT | Performed by: FAMILY MEDICINE

## 2025-01-31 PROCEDURE — 1123F ACP DISCUSS/DSCN MKR DOCD: CPT | Performed by: FAMILY MEDICINE

## 2025-01-31 PROCEDURE — 99214 OFFICE O/P EST MOD 30 MIN: CPT | Performed by: FAMILY MEDICINE

## 2025-01-31 PROCEDURE — 1036F TOBACCO NON-USER: CPT | Performed by: FAMILY MEDICINE

## 2025-01-31 PROCEDURE — 3078F DIAST BP <80 MM HG: CPT | Performed by: FAMILY MEDICINE

## 2025-01-31 PROCEDURE — G2211 COMPLEX E/M VISIT ADD ON: HCPCS | Performed by: FAMILY MEDICINE

## 2025-01-31 PROCEDURE — 3074F SYST BP LT 130 MM HG: CPT | Performed by: FAMILY MEDICINE

## 2025-01-31 PROCEDURE — 1160F RVW MEDS BY RX/DR IN RCRD: CPT | Performed by: FAMILY MEDICINE

## 2025-01-31 ASSESSMENT — ENCOUNTER SYMPTOMS
FACIAL ASYMMETRY: 0
ACTIVITY CHANGE: 0
COUGH: 0
CHEST TIGHTNESS: 0
CHOKING: 0
APPETITE CHANGE: 0
LIGHT-HEADEDNESS: 0
FATIGUE: 0
COLOR CHANGE: 0
BACK PAIN: 0
HEADACHES: 0
DIZZINESS: 0
ARTHRALGIAS: 0
FEVER: 0
PALPITATIONS: 0

## 2025-01-31 NOTE — PROGRESS NOTES
Subjective   Patient ID: Janel Mcclendon is a 74 y.o. female who presents for 3 month follow up .    HPI   Patient reports she is here to follow-up on blood sugar from labs recently performed in October. She has been watching her diet and has a chart of what she has been eating.   She has not been able to walk well. She is doing some movement exercises.     Review of Systems   Constitutional:  Negative for activity change, appetite change, fatigue and fever.   HENT:  Negative for congestion.    Respiratory:  Negative for cough, choking and chest tightness.    Cardiovascular:  Negative for chest pain, palpitations and leg swelling.   Musculoskeletal:  Negative for arthralgias, back pain and gait problem.   Skin:  Negative for color change and pallor.   Neurological:  Negative for dizziness, facial asymmetry, light-headedness and headaches.       Objective   /74 (BP Location: Left arm, Patient Position: Sitting)   Pulse 64   Wt 80.3 kg (177 lb)   BMI 26.72 kg/m²   BSA Body surface area is 1.97 meters squared.      Physical Exam  Constitutional:       General: She is not in acute distress.     Appearance: Normal appearance. She is not toxic-appearing.   HENT:      Head: Normocephalic.      Right Ear: Tympanic membrane, ear canal and external ear normal.      Left Ear: Tympanic membrane, ear canal and external ear normal.   Eyes:      Conjunctiva/sclera: Conjunctivae normal.      Pupils: Pupils are equal, round, and reactive to light.   Cardiovascular:      Rate and Rhythm: Normal rate and regular rhythm.      Pulses: Normal pulses.      Heart sounds: Normal heart sounds.   Pulmonary:      Effort: No respiratory distress.      Breath sounds: No wheezing, rhonchi or rales.   Abdominal:      General: Bowel sounds are normal. There is no distension.      Palpations: Abdomen is soft.      Tenderness: There is no abdominal tenderness.   Musculoskeletal:         General: No swelling or tenderness.   Skin:      Findings: No lesion or rash.   Neurological:      General: No focal deficit present.      Mental Status: She is alert and oriented to person, place, and time. Mental status is at baseline.      Gait: Gait normal.   Psychiatric:         Mood and Affect: Mood normal.         Behavior: Behavior normal.         Thought Content: Thought content normal.         Judgment: Judgment normal.       Office Visit on 11/01/2024   Component Date Value Ref Range Status    NONINV COLON CA DNA+OCC BLD SCRN S* 11/06/2024 Negative  Negative Final    Comment: NEGATIVE TEST RESULT. A negative Cologuard result indicates a low likelihood that a colorectal cancer (CRC) or advanced adenoma (adenomatous polyps with more advanced pre-malignant features)  is present. The chance that a person with a negative Cologuard test has a colorectal cancer is less than 1 in 1500 (negative predictive value >99.9%) or has an  advanced adenoma is less than  5.3% (negative predictive value 94.7%). These data are based on a prospective cross-sectional study of 10,000 individuals at average risk for colorectal cancer who were screened with both Cologuard and colonoscopy. (Sofia MAHONEY et al, N Engl J Med 2014;370(14):7291-7166) The normal value (reference range) for this assay is negative.    COLOGUARD RE-SCREENING RECOMMENDATION: Periodic colorectal cancer screening is an important part of preventive healthcare for asymptomatic individuals at average risk for colorectal cancer.  Following a negative Cologuard result, the American Cancer Society and U.S.                            Multi-Society Task Force screening guidelines recommend a Cologuard re-screening interval of 3 years.   References: American Cancer Society Guideline for Colorectal Cancer Screening: https://www.cancer.org/cancer/colon-rectal-cancer/eywommevs-jdzuszttv-ftwyrwn/acs-recommendations.html.; Francisco MEEHAN, Ayla TERRY, Turner GLOVER, Colorectal Cancer Screening: Recommendations for Physicians and  Patients from the U.S. Multi-Society Task Force on Colorectal Cancer Screening , Am J Gastroenterology 2017; 112:7200-7829.    TEST DESCRIPTION: Composite algorithmic analysis of stool DNA-biomarkers with hemoglobin immunoassay.   Quantitative values of individual biomarkers are not reportable and are not associated with individual biomarker result reference ranges. Cologuard is intended for colorectal cancer screening of adults of either sex, 45 years or older, who are at average-risk for colorectal cancer (CRC). Cologuard has been approved for use by the U.S. FDA. The performance of Cologuard was                            established in a cross sectional study of average-risk adults aged 50-84. Cologuard performance in patients ages 45 to 49 years was estimated by sub-group analysis of near-age groups. Colonoscopies performed for a positive result may find as the most clinically significant lesion: colorectal cancer [4.0%], advanced adenoma (including sessile serrated polyps greater than or equal to 1cm diameter) [20%] or non- advanced adenoma [31%]; or no colorectal neoplasia [45%]. These estimates are derived from a prospective cross-sectional screening study of 10,000 individuals at average risk for colorectal cancer who were screened with both Cologuard and colonoscopy. (Sofia ALMARAZ. et al, N Engl J Med 2014;370(14):9693-8137.) Cologuard may produce a false negative or false positive result (no colorectal cancer or precancerous polyp present at colonoscopy follow up). A negative Cologuard test result does not guarantee the absence of CRC or advanced adenoma (pre-cancer). The current Cologuard                            screening interval is every 3 years. (American Cancer Society and U.S. Multi-Society Task Force). Cologuard performance data in a 10,000 patient pivotal study using colonoscopy as the reference method can be accessed at the following location: [email or web address]. Additional description  of the Cologuard test process, warnings and precautions can be found at [email or web address].     Lab on 10/22/2024   Component Date Value Ref Range Status    WBC 10/22/2024 5.1  4.4 - 11.3 x10*3/uL Final    nRBC 10/22/2024 0.0  0.0 - 0.0 /100 WBCs Final    RBC 10/22/2024 4.65  4.00 - 5.20 x10*6/uL Final    Hemoglobin 10/22/2024 13.5  12.0 - 16.0 g/dL Final    Hematocrit 10/22/2024 39.8  36.0 - 46.0 % Final    MCV 10/22/2024 86  80 - 100 fL Final    MCH 10/22/2024 29.0  26.0 - 34.0 pg Final    MCHC 10/22/2024 33.9  32.0 - 36.0 g/dL Final    RDW 10/22/2024 12.8  11.5 - 14.5 % Final    Platelets 10/22/2024 190  150 - 450 x10*3/uL Final    Neutrophils % 10/22/2024 51.5  40.0 - 80.0 % Final    Immature Granulocytes %, Automated 10/22/2024 0.4  0.0 - 0.9 % Final    Immature Granulocyte Count (IG) includes promyelocytes, myelocytes and metamyelocytes but does not include bands. Percent differential counts (%) should be interpreted in the context of the absolute cell counts (cells/UL).    Lymphocytes % 10/22/2024 33.9  13.0 - 44.0 % Final    Monocytes % 10/22/2024 7.8  2.0 - 10.0 % Final    Eosinophils % 10/22/2024 5.8  0.0 - 6.0 % Final    Basophils % 10/22/2024 0.6  0.0 - 2.0 % Final    Neutrophils Absolute 10/22/2024 2.65  1.60 - 5.50 x10*3/uL Final    Percent differential counts (%) should be interpreted in the context of the absolute cell counts (cells/uL).    Immature Granulocytes Absolute, Au* 10/22/2024 0.02  0.00 - 0.50 x10*3/uL Final    Lymphocytes Absolute 10/22/2024 1.74  0.80 - 3.00 x10*3/uL Final    Monocytes Absolute 10/22/2024 0.40  0.05 - 0.80 x10*3/uL Final    Eosinophils Absolute 10/22/2024 0.30  0.00 - 0.40 x10*3/uL Final    Basophils Absolute 10/22/2024 0.03  0.00 - 0.10 x10*3/uL Final    Glucose 10/22/2024 158 (H)  74 - 99 mg/dL Final    Sodium 10/22/2024 143  136 - 145 mmol/L Final    Potassium 10/22/2024 4.3  3.5 - 5.3 mmol/L Final    Chloride 10/22/2024 108 (H)  98 - 107 mmol/L Final    Bicarbonate  10/22/2024 28  21 - 32 mmol/L Final    Anion Gap 10/22/2024 11  10 - 20 mmol/L Final    Urea Nitrogen 10/22/2024 9  6 - 23 mg/dL Final    Creatinine 10/22/2024 0.91  0.50 - 1.05 mg/dL Final    eGFR 10/22/2024 67  >60 mL/min/1.73m*2 Final    Calculations of estimated GFR are performed using the 2021 CKD-EPI Study Refit equation without the race variable for the IDMS-Traceable creatinine methods.  https://jasn.asnjournals.org/content/early/2021/09/22/ASN.3840778951    Calcium 10/22/2024 9.5  8.6 - 10.6 mg/dL Final    Albumin 10/22/2024 4.2  3.4 - 5.0 g/dL Final    Alkaline Phosphatase 10/22/2024 95  33 - 136 U/L Final    Total Protein 10/22/2024 6.6  6.4 - 8.2 g/dL Final    AST 10/22/2024 19  9 - 39 U/L Final    Bilirubin, Total 10/22/2024 0.6  0.0 - 1.2 mg/dL Final    ALT 10/22/2024 18  7 - 45 U/L Final    Patients treated with Sulfasalazine may generate falsely decreased results for ALT.    Cholesterol 10/22/2024 239 (H)  0 - 199 mg/dL Final          Age      Desirable   Borderline High   High     0-19 Y     0 - 169       170 - 199     >/= 200    20-24 Y     0 - 189       190 - 224     >/= 225         >24 Y     0 - 199       200 - 239     >/= 240   **All ranges are based on fasting samples. Specific   therapeutic targets will vary based on patient-specific   cardiac risk.    Pediatric guidelines reference:Pediatrics 2011, 128(S5).Adult guidelines reference: NCEP ATPIII Guidelines,MAYA 2001, 258:2486-97    Venipuncture immediately after or during the administration of Metamizole may lead to falsely low results. Testing should be performed immediately prior to Metamizole dosing.    HDL-Cholesterol 10/22/2024 57.7  mg/dL Final      Age       Very Low   Low     Normal    High    0-19 Y    < 35      < 40     40-45     ----  20-24 Y    ----     < 40      >45      ----        >24 Y      ----     < 40     40-60      >60      Cholesterol/HDL Ratio 10/22/2024 4.1   Final      Ref Values  Desirable  < 3.4  High Risk  > 5.0     LDL Calculated 10/22/2024 148 (H)  <=99 mg/dL Final                                Near   Borderline      AGE      Desirable  Optimal    High     High     Very High     0-19 Y     0 - 109     ---    110-129   >/= 130     ----    20-24 Y     0 - 119     ---    120-159   >/= 160     ----      >24 Y     0 -  99   100-129  130-159   160-189     >/=190      VLDL 10/22/2024 33  0 - 40 mg/dL Final    Triglycerides 10/22/2024 167 (H)  0 - 149 mg/dL Final       Age         Desirable   Borderline High   High     Very High   0 D-90 D    19 - 174         ----         ----        ----  91 D- 9 Y     0 -  74        75 -  99     >/= 100      ----    10-19 Y     0 -  89        90 - 129     >/= 130      ----    20-24 Y     0 - 114       115 - 149     >/= 150      ----         >24 Y     0 - 149       150 - 199    200- 499    >/= 500    Venipuncture immediately after or during the administration of Metamizole may lead to falsely low results. Testing should be performed immediately prior to Metamizole dosing.    Non HDL Cholesterol 10/22/2024 181 (H)  0 - 149 mg/dL Final          Age       Desirable   Borderline High   High     Very High     0-19 Y     0 - 119       120 - 144     >/= 145    >/= 160    20-24 Y     0 - 149       150 - 189     >/= 190      ----         >24 Y    30 mg/dL above LDL Cholesterol goal      Thyroid Stimulating Hormone 10/22/2024 2.69  0.44 - 3.98 mIU/L Final    Vitamin D, 25-Hydroxy, Total 10/22/2024 41  30 - 100 ng/mL Final    Hemoglobin A1C 10/22/2024 6.7 (H)  See comment % Final    Estimated Average Glucose 10/22/2024 146  Not Established mg/dL Final     Current Outpatient Medications on File Prior to Visit   Medication Sig Dispense Refill    amLODIPine (Norvasc) 10 mg tablet Take 1 tablet (10 mg) by mouth once daily. 90 tablet 3    atenolol (Tenormin) 100 mg tablet Take 1 tablet (100 mg) by mouth once daily in the evening. 90 tablet 3    busPIRone (Buspar) 10 mg tablet Take 1 tablet (10 mg) by mouth 3 times  a day. 90 tablet 11     No current facility-administered medications on file prior to visit.     No images are attached to the encounter.            Assessment/Plan   Diagnoses and all orders for this visit:  Psoriatic arthritis (Multi)  PVD (peripheral vascular disease) (CMS-[facility])  Essential (primary) hypertension  Type 2 diabetes mellitus without complication, unspecified whether long term insulin use (Multi)    1.  Patient to continue diet and exercise  2.  Patient to call for questions or concerns

## 2025-02-05 LAB
ALBUMIN SERPL-MCNC: 4.3 G/DL (ref 3.6–5.1)
ALP SERPL-CCNC: 87 U/L (ref 37–153)
ALT SERPL-CCNC: 12 U/L (ref 6–29)
ANION GAP SERPL CALCULATED.4IONS-SCNC: 11 MMOL/L (CALC) (ref 7–17)
AST SERPL-CCNC: 13 U/L (ref 10–35)
BILIRUB SERPL-MCNC: 0.6 MG/DL (ref 0.2–1.2)
BUN SERPL-MCNC: 16 MG/DL (ref 7–25)
CALCIUM SERPL-MCNC: 9.4 MG/DL (ref 8.6–10.4)
CHLORIDE SERPL-SCNC: 109 MMOL/L (ref 98–110)
CHOLEST SERPL-MCNC: 211 MG/DL
CHOLEST/HDLC SERPL: 3.8 (CALC)
CO2 SERPL-SCNC: 22 MMOL/L (ref 20–32)
CREAT SERPL-MCNC: 0.85 MG/DL (ref 0.6–1)
EGFRCR SERPLBLD CKD-EPI 2021: 73 ML/MIN/1.73M2
EST. AVERAGE GLUCOSE BLD GHB EST-MCNC: 134 MG/DL
EST. AVERAGE GLUCOSE BLD GHB EST-SCNC: 7.4 MMOL/L
GLUCOSE SERPL-MCNC: 128 MG/DL (ref 65–99)
HBA1C MFR BLD: 6.3 % OF TOTAL HGB
HDLC SERPL-MCNC: 55 MG/DL
LDLC SERPL CALC-MCNC: 136 MG/DL (CALC)
NONHDLC SERPL-MCNC: 156 MG/DL (CALC)
POTASSIUM SERPL-SCNC: 4.3 MMOL/L (ref 3.5–5.3)
PROT SERPL-MCNC: 6.6 G/DL (ref 6.1–8.1)
SODIUM SERPL-SCNC: 142 MMOL/L (ref 135–146)
TRIGL SERPL-MCNC: 96 MG/DL

## 2025-02-10 DIAGNOSIS — I10 ESSENTIAL (PRIMARY) HYPERTENSION: ICD-10-CM

## 2025-02-10 RX ORDER — ATENOLOL 100 MG/1
TABLET ORAL
Qty: 90 TABLET | Refills: 0 | Status: SHIPPED | OUTPATIENT
Start: 2025-02-10

## 2025-03-25 ENCOUNTER — TELEPHONE (OUTPATIENT)
Dept: PRIMARY CARE | Facility: CLINIC | Age: 75
End: 2025-03-25

## 2025-03-25 NOTE — TELEPHONE ENCOUNTER
Patient has been suffering with allergy symptoms for the past 3 weeks and would like to know what she can take over the counter that will work with her blood pressure medication.  She is having watery eyes sneezing and congestion please advise

## 2025-05-02 ENCOUNTER — APPOINTMENT (OUTPATIENT)
Dept: PRIMARY CARE | Facility: CLINIC | Age: 75
End: 2025-05-02
Payer: MEDICARE

## 2025-05-02 VITALS
DIASTOLIC BLOOD PRESSURE: 72 MMHG | HEART RATE: 60 BPM | BODY MASS INDEX: 26.78 KG/M2 | SYSTOLIC BLOOD PRESSURE: 130 MMHG | WEIGHT: 177.4 LBS

## 2025-05-02 DIAGNOSIS — E11.9 TYPE 2 DIABETES MELLITUS WITHOUT COMPLICATION, UNSPECIFIED WHETHER LONG TERM INSULIN USE: Primary | ICD-10-CM

## 2025-05-02 DIAGNOSIS — J01.10 ACUTE NON-RECURRENT FRONTAL SINUSITIS: ICD-10-CM

## 2025-05-02 PROCEDURE — 3075F SYST BP GE 130 - 139MM HG: CPT | Performed by: FAMILY MEDICINE

## 2025-05-02 PROCEDURE — 3078F DIAST BP <80 MM HG: CPT | Performed by: FAMILY MEDICINE

## 2025-05-02 PROCEDURE — 1157F ADVNC CARE PLAN IN RCRD: CPT | Performed by: FAMILY MEDICINE

## 2025-05-02 PROCEDURE — 99214 OFFICE O/P EST MOD 30 MIN: CPT | Performed by: FAMILY MEDICINE

## 2025-05-02 PROCEDURE — 1036F TOBACCO NON-USER: CPT | Performed by: FAMILY MEDICINE

## 2025-05-02 PROCEDURE — 1159F MED LIST DOCD IN RCRD: CPT | Performed by: FAMILY MEDICINE

## 2025-05-02 PROCEDURE — 1123F ACP DISCUSS/DSCN MKR DOCD: CPT | Performed by: FAMILY MEDICINE

## 2025-05-02 RX ORDER — AZITHROMYCIN 250 MG/1
TABLET, FILM COATED ORAL
Qty: 6 TABLET | Refills: 0 | Status: SHIPPED | OUTPATIENT
Start: 2025-05-02 | End: 2025-05-07

## 2025-05-02 ASSESSMENT — ENCOUNTER SYMPTOMS
OCCASIONAL FEELINGS OF UNSTEADINESS: 1
PALPITATIONS: 0
DEPRESSION: 0
COUGH: 0
FACIAL ASYMMETRY: 0
CHOKING: 0
DIZZINESS: 0
WHEEZING: 1
LOSS OF SENSATION IN FEET: 0
LIGHT-HEADEDNESS: 0
FEVER: 0
FATIGUE: 0
HEADACHES: 0
COLOR CHANGE: 0
ARTHRALGIAS: 0
APPETITE CHANGE: 0
ACTIVITY CHANGE: 0
BACK PAIN: 0
CHEST TIGHTNESS: 0
SHORTNESS OF BREATH: 0

## 2025-05-02 ASSESSMENT — PATIENT HEALTH QUESTIONNAIRE - PHQ9
SUM OF ALL RESPONSES TO PHQ9 QUESTIONS 1 AND 2: 0
1. LITTLE INTEREST OR PLEASURE IN DOING THINGS: NOT AT ALL
2. FEELING DOWN, DEPRESSED OR HOPELESS: NOT AT ALL
10. IF YOU CHECKED OFF ANY PROBLEMS, HOW DIFFICULT HAVE THESE PROBLEMS MADE IT FOR YOU TO DO YOUR WORK, TAKE CARE OF THINGS AT HOME, OR GET ALONG WITH OTHER PEOPLE: NOT DIFFICULT AT ALL

## 2025-05-02 NOTE — PROGRESS NOTES
Subjective   Patient ID: Janel Mcclendon is a 74 y.o. female who presents for Follow-up (A1C).    HPI   Patient reports she is here to follow-up on blood sugar from labs recently performed in October. She has been watching her diet and has a chart of what she has been eating.   She has not been able to walk well. She is doing some movement exercises.     Patient has been taking antihistamine for over 4 weeks but is still have difficulty with sinus pressure congestion and drainage on the back of her throat.  Wheezing every once in awhile.    Review of Systems   Constitutional:  Negative for activity change, appetite change, fatigue and fever.   HENT:  Positive for congestion.    Respiratory:  Positive for wheezing. Negative for cough, choking, chest tightness and shortness of breath.    Cardiovascular:  Negative for chest pain, palpitations and leg swelling.   Musculoskeletal:  Negative for arthralgias, back pain and gait problem.   Skin:  Negative for color change and pallor.   Neurological:  Negative for dizziness, facial asymmetry, light-headedness and headaches.       Objective   /72 (BP Location: Left arm, Patient Position: Sitting)   Pulse 60   Wt 80.5 kg (177 lb 6.4 oz)   BMI 26.78 kg/m²   BSA Body surface area is 1.97 meters squared.      Physical Exam  Constitutional:       General: She is not in acute distress.     Appearance: Normal appearance. She is not toxic-appearing.   HENT:      Head: Normocephalic.      Right Ear: Tympanic membrane, ear canal and external ear normal.      Left Ear: Tympanic membrane, ear canal and external ear normal.   Eyes:      Conjunctiva/sclera: Conjunctivae normal.      Pupils: Pupils are equal, round, and reactive to light.   Cardiovascular:      Rate and Rhythm: Normal rate and regular rhythm.      Pulses: Normal pulses.      Heart sounds: Normal heart sounds.   Pulmonary:      Effort: No respiratory distress.      Breath sounds: No wheezing, rhonchi or rales.    Abdominal:      General: Bowel sounds are normal. There is no distension.      Palpations: Abdomen is soft.      Tenderness: There is no abdominal tenderness.   Musculoskeletal:         General: No swelling or tenderness.   Skin:     Findings: No lesion or rash.   Neurological:      General: No focal deficit present.      Mental Status: She is alert and oriented to person, place, and time. Mental status is at baseline.      Gait: Gait normal.   Psychiatric:         Mood and Affect: Mood normal.         Behavior: Behavior normal.         Thought Content: Thought content normal.         Judgment: Judgment normal.       Office Visit on 01/31/2025   Component Date Value Ref Range Status    HEMOGLOBIN A1c 02/04/2025 6.3 (H)  <5.7 % of total Hgb Final    Comment: For someone without known diabetes, a hemoglobin   A1c value between 5.7% and 6.4% is consistent with  prediabetes and should be confirmed with a   follow-up test.     For someone with known diabetes, a value <7%  indicates that their diabetes is well controlled. A1c  targets should be individualized based on duration of  diabetes, age, comorbid conditions, and other  considerations.     This assay result is consistent with an increased risk  of diabetes.     Currently, no consensus exists regarding use of  hemoglobin A1c for diagnosis of diabetes for children.         eAG (mg/dL) 02/04/2025 134  mg/dL Final    eAG (mmol/L) 02/04/2025 7.4  mmol/L Final    GLUCOSE 02/04/2025 128 (H)  65 - 99 mg/dL Final    Comment:               Fasting reference interval     For someone without known diabetes, a glucose  value >125 mg/dL indicates that they may have  diabetes and this should be confirmed with a  follow-up test.         UREA NITROGEN (BUN) 02/04/2025 16  7 - 25 mg/dL Final    CREATININE 02/04/2025 0.85  0.60 - 1.00 mg/dL Final    EGFR 02/04/2025 73  > OR = 60 mL/min/1.73m2 Final    SODIUM 02/04/2025 142  135 - 146 mmol/L Final    POTASSIUM 02/04/2025 4.3  3.5 -  5.3 mmol/L Final    CHLORIDE 02/04/2025 109  98 - 110 mmol/L Final    CARBON DIOXIDE 02/04/2025 22  20 - 32 mmol/L Final    ELECTROLYTE BALANCE 02/04/2025 11  7 - 17 mmol/L (calc) Final    CALCIUM 02/04/2025 9.4  8.6 - 10.4 mg/dL Final    PROTEIN, TOTAL 02/04/2025 6.6  6.1 - 8.1 g/dL Final    ALBUMIN 02/04/2025 4.3  3.6 - 5.1 g/dL Final    BILIRUBIN, TOTAL 02/04/2025 0.6  0.2 - 1.2 mg/dL Final    ALKALINE PHOSPHATASE 02/04/2025 87  37 - 153 U/L Final    AST 02/04/2025 13  10 - 35 U/L Final    ALT 02/04/2025 12  6 - 29 U/L Final    CHOLESTEROL, TOTAL 02/04/2025 211 (H)  <200 mg/dL Final    HDL CHOLESTEROL 02/04/2025 55  > OR = 50 mg/dL Final    TRIGLYCERIDES 02/04/2025 96  <150 mg/dL Final    LDL-CHOLESTEROL 02/04/2025 136 (H)  mg/dL (calc) Final    Comment: Reference range: <100     Desirable range <100 mg/dL for primary prevention;    <70 mg/dL for patients with CHD or diabetic patients   with > or = 2 CHD risk factors.     LDL-C is now calculated using the Adolfo-Stevens   calculation, which is a validated novel method providing   better accuracy than the Friedewald equation in the   estimation of LDL-C.   Adolfo SS et al. MAYA. 2013;310(19): 6194-8848   (http://education.Zivity.com/faq/QET434)      CHOL/HDLC RATIO 02/04/2025 3.8  <5.0 (calc) Final    NON HDL CHOLESTEROL 02/04/2025 156 (H)  <130 mg/dL (calc) Final    Comment: For patients with diabetes plus 1 major ASCVD risk   factor, treating to a non-HDL-C goal of <100 mg/dL   (LDL-C of <70 mg/dL) is considered a therapeutic   option.     Office Visit on 11/01/2024   Component Date Value Ref Range Status    NONINV COLON CA DNA+OCC BLD SCRN S* 11/06/2024 Negative  Negative Final    Comment: NEGATIVE TEST RESULT. A negative Cologuard result indicates a low likelihood that a colorectal cancer (CRC) or advanced adenoma (adenomatous polyps with more advanced pre-malignant features)  is present. The chance that a person with a negative Cologuard test has a  colorectal cancer is less than 1 in 1500 (negative predictive value >99.9%) or has an  advanced adenoma is less than  5.3% (negative predictive value 94.7%). These data are based on a prospective cross-sectional study of 10,000 individuals at average risk for colorectal cancer who were screened with both Cologuard and colonoscopy. (Sofia Rodriguez al, N Engl J Med 2014;370(14):7002-9459) The normal value (reference range) for this assay is negative.    COLOGUARD RE-SCREENING RECOMMENDATION: Periodic colorectal cancer screening is an important part of preventive healthcare for asymptomatic individuals at average risk for colorectal cancer.  Following a negative Cologuard result, the American Cancer Society and U.S.                            Multi-Society Task Force screening guidelines recommend a Cologuard re-screening interval of 3 years.   References: American Cancer Society Guideline for Colorectal Cancer Screening: https://www.cancer.org/cancer/colon-rectal-cancer/qnilcjqwl-mjzdrzipo-koyeymo/acs-recommendations.html.; Francisco DK, Ayla TERRY, Turner SMITHK, Colorectal Cancer Screening: Recommendations for Physicians and Patients from the U.S. Multi-Society Task Force on Colorectal Cancer Screening , Am J Gastroenterology 2017; 112:3324-7983.    TEST DESCRIPTION: Composite algorithmic analysis of stool DNA-biomarkers with hemoglobin immunoassay.   Quantitative values of individual biomarkers are not reportable and are not associated with individual biomarker result reference ranges. Cologuard is intended for colorectal cancer screening of adults of either sex, 45 years or older, who are at average-risk for colorectal cancer (CRC). Cologuard has been approved for use by the U.S. FDA. The performance of Cologuard was                            established in a cross sectional study of average-risk adults aged 50-84. Cologuard performance in patients ages 45 to 49 years was estimated by sub-group analysis of near-age  groups. Colonoscopies performed for a positive result may find as the most clinically significant lesion: colorectal cancer [4.0%], advanced adenoma (including sessile serrated polyps greater than or equal to 1cm diameter) [20%] or non- advanced adenoma [31%]; or no colorectal neoplasia [45%]. These estimates are derived from a prospective cross-sectional screening study of 10,000 individuals at average risk for colorectal cancer who were screened with both Cologuard and colonoscopy. (Sofia MAHONEY et al, N Engl J Med 2014;370(14):5207-9409.) Cologuard may produce a false negative or false positive result (no colorectal cancer or precancerous polyp present at colonoscopy follow up). A negative Cologuard test result does not guarantee the absence of CRC or advanced adenoma (pre-cancer). The current Cologuard                            screening interval is every 3 years. (American Cancer Society and U.S. Multi-Society Task Force). Cologuard performance data in a 10,000 patient pivotal study using colonoscopy as the reference method can be accessed at the following location: [email or web address]/results. Additional description of the Cologuard test process, warnings and precautions can be found at [email or web address].ARKeX.     Lab on 10/22/2024   Component Date Value Ref Range Status    WBC 10/22/2024 5.1  4.4 - 11.3 x10*3/uL Final    nRBC 10/22/2024 0.0  0.0 - 0.0 /100 WBCs Final    RBC 10/22/2024 4.65  4.00 - 5.20 x10*6/uL Final    Hemoglobin 10/22/2024 13.5  12.0 - 16.0 g/dL Final    Hematocrit 10/22/2024 39.8  36.0 - 46.0 % Final    MCV 10/22/2024 86  80 - 100 fL Final    MCH 10/22/2024 29.0  26.0 - 34.0 pg Final    MCHC 10/22/2024 33.9  32.0 - 36.0 g/dL Final    RDW 10/22/2024 12.8  11.5 - 14.5 % Final    Platelets 10/22/2024 190  150 - 450 x10*3/uL Final    Neutrophils % 10/22/2024 51.5  40.0 - 80.0 % Final    Immature Granulocytes %, Automated 10/22/2024 0.4  0.0 - 0.9 % Final    Immature Granulocyte Count (IG) includes  promyelocytes, myelocytes and metamyelocytes but does not include bands. Percent differential counts (%) should be interpreted in the context of the absolute cell counts (cells/UL).    Lymphocytes % 10/22/2024 33.9  13.0 - 44.0 % Final    Monocytes % 10/22/2024 7.8  2.0 - 10.0 % Final    Eosinophils % 10/22/2024 5.8  0.0 - 6.0 % Final    Basophils % 10/22/2024 0.6  0.0 - 2.0 % Final    Neutrophils Absolute 10/22/2024 2.65  1.60 - 5.50 x10*3/uL Final    Percent differential counts (%) should be interpreted in the context of the absolute cell counts (cells/uL).    Immature Granulocytes Absolute, Au* 10/22/2024 0.02  0.00 - 0.50 x10*3/uL Final    Lymphocytes Absolute 10/22/2024 1.74  0.80 - 3.00 x10*3/uL Final    Monocytes Absolute 10/22/2024 0.40  0.05 - 0.80 x10*3/uL Final    Eosinophils Absolute 10/22/2024 0.30  0.00 - 0.40 x10*3/uL Final    Basophils Absolute 10/22/2024 0.03  0.00 - 0.10 x10*3/uL Final    Glucose 10/22/2024 158 (H)  74 - 99 mg/dL Final    Sodium 10/22/2024 143  136 - 145 mmol/L Final    Potassium 10/22/2024 4.3  3.5 - 5.3 mmol/L Final    Chloride 10/22/2024 108 (H)  98 - 107 mmol/L Final    Bicarbonate 10/22/2024 28  21 - 32 mmol/L Final    Anion Gap 10/22/2024 11  10 - 20 mmol/L Final    Urea Nitrogen 10/22/2024 9  6 - 23 mg/dL Final    Creatinine 10/22/2024 0.91  0.50 - 1.05 mg/dL Final    eGFR 10/22/2024 67  >60 mL/min/1.73m*2 Final    Calculations of estimated GFR are performed using the 2021 CKD-EPI Study Refit equation without the race variable for the IDMS-Traceable creatinine methods.  https://jasn.asnjournals.org/content/early/2021/09/22/ASN.2979667772    Calcium 10/22/2024 9.5  8.6 - 10.6 mg/dL Final    Albumin 10/22/2024 4.2  3.4 - 5.0 g/dL Final    Alkaline Phosphatase 10/22/2024 95  33 - 136 U/L Final    Total Protein 10/22/2024 6.6  6.4 - 8.2 g/dL Final    AST 10/22/2024 19  9 - 39 U/L Final    Bilirubin, Total 10/22/2024 0.6  0.0 - 1.2 mg/dL Final    ALT 10/22/2024 18  7 - 45 U/L  Final    Patients treated with Sulfasalazine may generate falsely decreased results for ALT.    Cholesterol 10/22/2024 239 (H)  0 - 199 mg/dL Final          Age      Desirable   Borderline High   High     0-19 Y     0 - 169       170 - 199     >/= 200    20-24 Y     0 - 189       190 - 224     >/= 225         >24 Y     0 - 199       200 - 239     >/= 240   **All ranges are based on fasting samples. Specific   therapeutic targets will vary based on patient-specific   cardiac risk.    Pediatric guidelines reference:Pediatrics 2011, 128(S5).Adult guidelines reference: NCEP ATPIII Guidelines,MAYA 2001, 258:2486-97    Venipuncture immediately after or during the administration of Metamizole may lead to falsely low results. Testing should be performed immediately prior to Metamizole dosing.    HDL-Cholesterol 10/22/2024 57.7  mg/dL Final      Age       Very Low   Low     Normal    High    0-19 Y    < 35      < 40     40-45     ----  20-24 Y    ----     < 40      >45      ----        >24 Y      ----     < 40     40-60      >60      Cholesterol/HDL Ratio 10/22/2024 4.1   Final      Ref Values  Desirable  < 3.4  High Risk  > 5.0    LDL Calculated 10/22/2024 148 (H)  <=99 mg/dL Final                                Near   Borderline      AGE      Desirable  Optimal    High     High     Very High     0-19 Y     0 - 109     ---    110-129   >/= 130     ----    20-24 Y     0 - 119     ---    120-159   >/= 160     ----      >24 Y     0 -  99   100-129  130-159   160-189     >/=190      VLDL 10/22/2024 33  0 - 40 mg/dL Final    Triglycerides 10/22/2024 167 (H)  0 - 149 mg/dL Final       Age         Desirable   Borderline High   High     Very High   0 D-90 D    19 - 174         ----         ----        ----  91 D- 9 Y     0 -  74        75 -  99     >/= 100      ----    10-19 Y     0 -  89        90 - 129     >/= 130      ----    20-24 Y     0 - 114       115 - 149     >/= 150      ----         >24 Y     0 - 149       150 - 199     200- 499    >/= 500    Venipuncture immediately after or during the administration of Metamizole may lead to falsely low results. Testing should be performed immediately prior to Metamizole dosing.    Non HDL Cholesterol 10/22/2024 181 (H)  0 - 149 mg/dL Final          Age       Desirable   Borderline High   High     Very High     0-19 Y     0 - 119       120 - 144     >/= 145    >/= 160    20-24 Y     0 - 149       150 - 189     >/= 190      ----         >24 Y    30 mg/dL above LDL Cholesterol goal      Thyroid Stimulating Hormone 10/22/2024 2.69  0.44 - 3.98 mIU/L Final    Vitamin D, 25-Hydroxy, Total 10/22/2024 41  30 - 100 ng/mL Final    Hemoglobin A1C 10/22/2024 6.7 (H)  See comment % Final    Estimated Average Glucose 10/22/2024 146  Not Established mg/dL Final     Current Outpatient Medications on File Prior to Visit   Medication Sig Dispense Refill    amLODIPine (Norvasc) 10 mg tablet Take 1 tablet (10 mg) by mouth once daily. 90 tablet 3    atenolol (Tenormin) 100 mg tablet TAKE ONE TABLET BY MOUTH ONCE EVERY EVENING 90 tablet 0    busPIRone (Buspar) 10 mg tablet Take 1 tablet (10 mg) by mouth 3 times a day. 90 tablet 11     No current facility-administered medications on file prior to visit.     No images are attached to the encounter.            Assessment/Plan   Diagnoses and all orders for this visit:  Type 2 diabetes mellitus without complication, unspecified whether long term insulin use  -     Hemoglobin A1C; Future  -     Lipid Panel; Future  -     Comprehensive Metabolic Panel; Future  Acute non-recurrent frontal sinusitis  -     azithromycin (Zithromax) 250 mg tablet; Take 2 tablets (500 mg) by mouth once daily for 1 day, THEN 1 tablet (250 mg) once daily for 4 days. Take 2 tabs (500 mg) by mouth today, than 1 daily for 4 days.      1.  Patient to continue diet and exercise  2.  Patient to start on antibiotics  3.  Patient to call for questions or concerns

## 2025-05-09 ENCOUNTER — TELEPHONE (OUTPATIENT)
Dept: PRIMARY CARE | Facility: CLINIC | Age: 75
End: 2025-05-09
Payer: MEDICARE

## 2025-05-09 NOTE — TELEPHONE ENCOUNTER
Pt requested a refill for    atenolol (Tenormin) 100 mg tablet   TAKE ONE TABLET BY MOUTH ONCE EVERY EVENING   Quantity: 90 tablet     [id number] - [place] - [address]   [address] [place] [address]  Phone: [phone]  Fax: [phone]

## 2025-05-13 DIAGNOSIS — I10 ESSENTIAL (PRIMARY) HYPERTENSION: ICD-10-CM

## 2025-05-13 RX ORDER — ATENOLOL 100 MG/1
TABLET ORAL
Qty: 90 TABLET | Refills: 0 | Status: SHIPPED | OUTPATIENT
Start: 2025-05-13

## 2025-07-19 LAB
ALBUMIN SERPL-MCNC: 4.3 G/DL (ref 3.6–5.1)
ALP SERPL-CCNC: 98 U/L (ref 37–153)
ALT SERPL-CCNC: 11 U/L (ref 6–29)
ANION GAP SERPL CALCULATED.4IONS-SCNC: 9 MMOL/L (CALC) (ref 7–17)
AST SERPL-CCNC: 12 U/L (ref 10–35)
BILIRUB SERPL-MCNC: 0.7 MG/DL (ref 0.2–1.2)
BUN SERPL-MCNC: 16 MG/DL (ref 7–25)
CALCIUM SERPL-MCNC: 9.3 MG/DL (ref 8.6–10.4)
CHLORIDE SERPL-SCNC: 109 MMOL/L (ref 98–110)
CHOLEST SERPL-MCNC: 232 MG/DL
CHOLEST/HDLC SERPL: 3.9 (CALC)
CO2 SERPL-SCNC: 24 MMOL/L (ref 20–32)
CREAT SERPL-MCNC: 0.76 MG/DL (ref 0.6–1)
EGFRCR SERPLBLD CKD-EPI 2021: 83 ML/MIN/1.73M2
EST. AVERAGE GLUCOSE BLD GHB EST-MCNC: 143 MG/DL
EST. AVERAGE GLUCOSE BLD GHB EST-SCNC: 7.9 MMOL/L
GLUCOSE SERPL-MCNC: 130 MG/DL (ref 65–99)
HBA1C MFR BLD: 6.6 %
HDLC SERPL-MCNC: 60 MG/DL
LDLC SERPL CALC-MCNC: 146 MG/DL (CALC)
NONHDLC SERPL-MCNC: 172 MG/DL (CALC)
POTASSIUM SERPL-SCNC: 4.2 MMOL/L (ref 3.5–5.3)
PROT SERPL-MCNC: 6.7 G/DL (ref 6.1–8.1)
SODIUM SERPL-SCNC: 142 MMOL/L (ref 135–146)
TRIGL SERPL-MCNC: 134 MG/DL

## 2025-07-29 ENCOUNTER — TELEPHONE (OUTPATIENT)
Dept: PRIMARY CARE | Facility: CLINIC | Age: 75
End: 2025-07-29
Payer: MEDICARE

## 2025-08-06 DIAGNOSIS — I10 ESSENTIAL (PRIMARY) HYPERTENSION: ICD-10-CM

## 2025-08-06 RX ORDER — ATENOLOL 100 MG/1
100 TABLET ORAL NIGHTLY
Qty: 90 TABLET | Refills: 0 | Status: SHIPPED | OUTPATIENT
Start: 2025-08-06

## 2025-08-13 ENCOUNTER — OFFICE VISIT (OUTPATIENT)
Dept: PRIMARY CARE | Facility: CLINIC | Age: 75
End: 2025-08-13
Payer: MEDICARE

## 2025-08-13 VITALS
DIASTOLIC BLOOD PRESSURE: 75 MMHG | RESPIRATION RATE: 18 BRPM | OXYGEN SATURATION: 98 % | HEART RATE: 77 BPM | SYSTOLIC BLOOD PRESSURE: 146 MMHG | BODY MASS INDEX: 26.78 KG/M2 | WEIGHT: 177.4 LBS

## 2025-08-13 DIAGNOSIS — J20.9 ACUTE BRONCHITIS, UNSPECIFIED ORGANISM: Primary | ICD-10-CM

## 2025-08-13 PROCEDURE — 1159F MED LIST DOCD IN RCRD: CPT | Performed by: FAMILY MEDICINE

## 2025-08-13 PROCEDURE — 99214 OFFICE O/P EST MOD 30 MIN: CPT | Performed by: FAMILY MEDICINE

## 2025-08-13 PROCEDURE — 3077F SYST BP >= 140 MM HG: CPT | Performed by: FAMILY MEDICINE

## 2025-08-13 PROCEDURE — 1036F TOBACCO NON-USER: CPT | Performed by: FAMILY MEDICINE

## 2025-08-13 PROCEDURE — 1160F RVW MEDS BY RX/DR IN RCRD: CPT | Performed by: FAMILY MEDICINE

## 2025-08-13 PROCEDURE — 3078F DIAST BP <80 MM HG: CPT | Performed by: FAMILY MEDICINE

## 2025-08-13 RX ORDER — PREDNISONE 5 MG/1
TABLET ORAL
Qty: 55 TABLET | Refills: 0 | Status: SHIPPED | OUTPATIENT
Start: 2025-08-13

## 2025-08-13 RX ORDER — TOBRAMYCIN 3 MG/ML
2 SOLUTION/ DROPS OPHTHALMIC EVERY 4 HOURS
Qty: 5 ML | Refills: 0 | Status: CANCELLED | OUTPATIENT
Start: 2025-08-13 | End: 2025-08-27

## 2025-08-13 RX ORDER — AZITHROMYCIN 250 MG/1
TABLET, FILM COATED ORAL
Qty: 6 TABLET | Refills: 0 | Status: SHIPPED | OUTPATIENT
Start: 2025-08-13 | End: 2025-08-18

## 2025-08-13 ASSESSMENT — ENCOUNTER SYMPTOMS
COLOR CHANGE: 0
EYE ITCHING: 1
ARTHRALGIAS: 0
FEVER: 0
PALPITATIONS: 0
BACK PAIN: 0
EYE PAIN: 0
EYE DISCHARGE: 1
DIZZINESS: 0
FATIGUE: 0
LIGHT-HEADEDNESS: 0
CHOKING: 0
EYE REDNESS: 1
FACIAL ASYMMETRY: 0
PHOTOPHOBIA: 0
COUGH: 0
ACTIVITY CHANGE: 0
CHEST TIGHTNESS: 0
APPETITE CHANGE: 0
HEADACHES: 0

## 2025-08-13 ASSESSMENT — PATIENT HEALTH QUESTIONNAIRE - PHQ9
1. LITTLE INTEREST OR PLEASURE IN DOING THINGS: NOT AT ALL
SUM OF ALL RESPONSES TO PHQ9 QUESTIONS 1 AND 2: 0
2. FEELING DOWN, DEPRESSED OR HOPELESS: NOT AT ALL

## 2025-08-13 ASSESSMENT — COLUMBIA-SUICIDE SEVERITY RATING SCALE - C-SSRS
1. IN THE PAST MONTH, HAVE YOU WISHED YOU WERE DEAD OR WISHED YOU COULD GO TO SLEEP AND NOT WAKE UP?: NO
6. HAVE YOU EVER DONE ANYTHING, STARTED TO DO ANYTHING, OR PREPARED TO DO ANYTHING TO END YOUR LIFE?: NO
2. HAVE YOU ACTUALLY HAD ANY THOUGHTS OF KILLING YOURSELF?: NO

## 2025-08-15 ENCOUNTER — TELEPHONE (OUTPATIENT)
Dept: PRIMARY CARE | Facility: CLINIC | Age: 75
End: 2025-08-15
Payer: MEDICARE